# Patient Record
Sex: MALE | Race: WHITE | Employment: UNEMPLOYED | URBAN - METROPOLITAN AREA
[De-identification: names, ages, dates, MRNs, and addresses within clinical notes are randomized per-mention and may not be internally consistent; named-entity substitution may affect disease eponyms.]

---

## 2020-11-12 ENCOUNTER — HOSPITAL ENCOUNTER (INPATIENT)
Age: 40
LOS: 10 days | Discharge: HOME | DRG: 853 | End: 2020-11-22
Attending: EMERGENCY MEDICINE | Admitting: SURGERY
Payer: COMMERCIAL

## 2020-11-12 PROBLEM — M79.89 NECROTIZING SOFT TISSUE INFECTION: Status: ACTIVE | Noted: 2020-11-12

## 2020-11-12 LAB
INR BLD: 1.5
PROTHROMBIN TIME: 17.2 SEC (ref 9.3–12.4)

## 2020-11-12 PROCEDURE — 87040 BLOOD CULTURE FOR BACTERIA: CPT

## 2020-11-12 PROCEDURE — 85730 THROMBOPLASTIN TIME PARTIAL: CPT

## 2020-11-12 PROCEDURE — 83735 ASSAY OF MAGNESIUM: CPT

## 2020-11-12 PROCEDURE — 85610 PROTHROMBIN TIME: CPT

## 2020-11-12 PROCEDURE — 85025 COMPLETE CBC W/AUTO DIFF WBC: CPT

## 2020-11-12 PROCEDURE — 83605 ASSAY OF LACTIC ACID: CPT

## 2020-11-12 PROCEDURE — 2000000000 HC ICU R&B

## 2020-11-12 PROCEDURE — 80053 COMPREHEN METABOLIC PANEL: CPT

## 2020-11-12 PROCEDURE — 99283 EMERGENCY DEPT VISIT LOW MDM: CPT

## 2020-11-12 RX ORDER — CLINDAMYCIN PHOSPHATE 600 MG/50ML
600 INJECTION INTRAVENOUS EVERY 8 HOURS
Status: DISCONTINUED | OUTPATIENT
Start: 2020-11-13 | End: 2020-11-13

## 2020-11-12 RX ORDER — SODIUM CHLORIDE 9 MG/ML
1000 INJECTION, SOLUTION INTRAVENOUS CONTINUOUS
Status: DISCONTINUED | OUTPATIENT
Start: 2020-11-12 | End: 2020-11-13

## 2020-11-13 ENCOUNTER — ANESTHESIA (OUTPATIENT)
Dept: OPERATING ROOM | Age: 40
DRG: 853 | End: 2020-11-13
Payer: COMMERCIAL

## 2020-11-13 ENCOUNTER — ANESTHESIA EVENT (OUTPATIENT)
Dept: OPERATING ROOM | Age: 40
DRG: 853 | End: 2020-11-13
Payer: COMMERCIAL

## 2020-11-13 VITALS — DIASTOLIC BLOOD PRESSURE: 73 MMHG | OXYGEN SATURATION: 87 % | TEMPERATURE: 101.3 F | SYSTOLIC BLOOD PRESSURE: 132 MMHG

## 2020-11-13 LAB
ALBUMIN SERPL-MCNC: 2.3 G/DL (ref 3.5–5.2)
ALBUMIN SERPL-MCNC: 2.4 G/DL (ref 3.5–5.2)
ALP BLD-CCNC: 166 U/L (ref 40–129)
ALP BLD-CCNC: 201 U/L (ref 40–129)
ALT SERPL-CCNC: 11 U/L (ref 0–40)
ALT SERPL-CCNC: 11 U/L (ref 0–40)
ANION GAP SERPL CALCULATED.3IONS-SCNC: 11 MMOL/L (ref 7–16)
ANION GAP SERPL CALCULATED.3IONS-SCNC: 13 MMOL/L (ref 7–16)
ANISOCYTOSIS: ABNORMAL
ANISOCYTOSIS: ABNORMAL
APTT: 26.4 SEC (ref 24.5–35.1)
AST SERPL-CCNC: 17 U/L (ref 0–39)
AST SERPL-CCNC: 19 U/L (ref 0–39)
BASOPHILS ABSOLUTE: 0 E9/L (ref 0–0.2)
BASOPHILS ABSOLUTE: 0 E9/L (ref 0–0.2)
BASOPHILS RELATIVE PERCENT: 0.1 % (ref 0–2)
BASOPHILS RELATIVE PERCENT: 0.2 % (ref 0–2)
BILIRUB SERPL-MCNC: 3.4 MG/DL (ref 0–1.2)
BILIRUB SERPL-MCNC: 3.7 MG/DL (ref 0–1.2)
BUN BLDV-MCNC: 15 MG/DL (ref 6–20)
BUN BLDV-MCNC: 18 MG/DL (ref 6–20)
BURR CELLS: ABNORMAL
BURR CELLS: ABNORMAL
CALCIUM SERPL-MCNC: 8 MG/DL (ref 8.6–10.2)
CALCIUM SERPL-MCNC: 8.1 MG/DL (ref 8.6–10.2)
CHLORIDE BLD-SCNC: 93 MMOL/L (ref 98–107)
CHLORIDE BLD-SCNC: 97 MMOL/L (ref 98–107)
CO2: 22 MMOL/L (ref 22–29)
CO2: 26 MMOL/L (ref 22–29)
CREAT SERPL-MCNC: 0.7 MG/DL (ref 0.7–1.2)
CREAT SERPL-MCNC: 0.9 MG/DL (ref 0.7–1.2)
EOSINOPHILS ABSOLUTE: 0 E9/L (ref 0.05–0.5)
EOSINOPHILS ABSOLUTE: 0 E9/L (ref 0.05–0.5)
EOSINOPHILS RELATIVE PERCENT: 0 % (ref 0–6)
EOSINOPHILS RELATIVE PERCENT: 0.1 % (ref 0–6)
GFR AFRICAN AMERICAN: >60
GFR AFRICAN AMERICAN: >60
GFR NON-AFRICAN AMERICAN: >60 ML/MIN/1.73
GFR NON-AFRICAN AMERICAN: >60 ML/MIN/1.73
GLUCOSE BLD-MCNC: 118 MG/DL (ref 74–99)
GLUCOSE BLD-MCNC: 124 MG/DL (ref 74–99)
GRAM STAIN ORDERABLE: NORMAL
HCT VFR BLD CALC: 33 % (ref 37–54)
HCT VFR BLD CALC: 34.8 % (ref 37–54)
HEMOGLOBIN: 11.3 G/DL (ref 12.5–16.5)
HEMOGLOBIN: 11.7 G/DL (ref 12.5–16.5)
LACTIC ACID: 1.1 MMOL/L (ref 0.5–2.2)
LYMPHOCYTES ABSOLUTE: 0 E9/L (ref 1.5–4)
LYMPHOCYTES ABSOLUTE: 0.77 E9/L (ref 1.5–4)
LYMPHOCYTES RELATIVE PERCENT: 1.8 % (ref 20–42)
LYMPHOCYTES RELATIVE PERCENT: 1.8 % (ref 20–42)
MAGNESIUM: 2.1 MG/DL (ref 1.6–2.6)
MAGNESIUM: 2.2 MG/DL (ref 1.6–2.6)
MCH RBC QN AUTO: 28.1 PG (ref 26–35)
MCH RBC QN AUTO: 28.5 PG (ref 26–35)
MCHC RBC AUTO-ENTMCNC: 33.6 % (ref 32–34.5)
MCHC RBC AUTO-ENTMCNC: 34.2 % (ref 32–34.5)
MCV RBC AUTO: 83.3 FL (ref 80–99.9)
MCV RBC AUTO: 83.5 FL (ref 80–99.9)
METAMYELOCYTES RELATIVE PERCENT: 0.9 % (ref 0–1)
MONOCYTES ABSOLUTE: 0.85 E9/L (ref 0.1–0.95)
MONOCYTES ABSOLUTE: 1.55 E9/L (ref 0.1–0.95)
MONOCYTES RELATIVE PERCENT: 1.7 % (ref 2–12)
MONOCYTES RELATIVE PERCENT: 4.4 % (ref 2–12)
NEUTROPHILS ABSOLUTE: 36.38 E9/L (ref 1.8–7.3)
NEUTROPHILS ABSOLUTE: 41.85 E9/L (ref 1.8–7.3)
NEUTROPHILS RELATIVE PERCENT: 93.9 % (ref 43–80)
NEUTROPHILS RELATIVE PERCENT: 97.4 % (ref 43–80)
NUCLEATED RED BLOOD CELLS: 0.9 /100 WBC
OVALOCYTES: ABNORMAL
PDW BLD-RTO: 14.6 FL (ref 11.5–15)
PDW BLD-RTO: 14.7 FL (ref 11.5–15)
PHOSPHORUS: 3.7 MG/DL (ref 2.5–4.5)
PLATELET # BLD: 388 E9/L (ref 130–450)
PLATELET # BLD: 392 E9/L (ref 130–450)
PMV BLD AUTO: 10.6 FL (ref 7–12)
PMV BLD AUTO: 11.4 FL (ref 7–12)
POIKILOCYTES: ABNORMAL
POIKILOCYTES: ABNORMAL
POLYCHROMASIA: ABNORMAL
POLYCHROMASIA: ABNORMAL
POTASSIUM REFLEX MAGNESIUM: 3.4 MMOL/L (ref 3.5–5)
POTASSIUM SERPL-SCNC: 3.7 MMOL/L (ref 3.5–5)
RBC # BLD: 3.96 E12/L (ref 3.8–5.8)
RBC # BLD: 4.17 E12/L (ref 3.8–5.8)
SCHISTOCYTES: ABNORMAL
SODIUM BLD-SCNC: 130 MMOL/L (ref 132–146)
SODIUM BLD-SCNC: 132 MMOL/L (ref 132–146)
TARGET CELLS: ABNORMAL
TOTAL PROTEIN: 5.6 G/DL (ref 6.4–8.3)
TOTAL PROTEIN: 6.2 G/DL (ref 6.4–8.3)
WBC # BLD: 38.7 E9/L (ref 4.5–11.5)
WBC # BLD: 42.7 E9/L (ref 4.5–11.5)

## 2020-11-13 PROCEDURE — 94770 HC ETCO2 MONITOR DAILY: CPT

## 2020-11-13 PROCEDURE — 97530 THERAPEUTIC ACTIVITIES: CPT

## 2020-11-13 PROCEDURE — 97535 SELF CARE MNGMENT TRAINING: CPT

## 2020-11-13 PROCEDURE — 85025 COMPLETE CBC W/AUTO DIFF WBC: CPT

## 2020-11-13 PROCEDURE — 6360000002 HC RX W HCPCS: Performed by: STUDENT IN AN ORGANIZED HEALTH CARE EDUCATION/TRAINING PROGRAM

## 2020-11-13 PROCEDURE — 2580000003 HC RX 258: Performed by: EMERGENCY MEDICINE

## 2020-11-13 PROCEDURE — 2000000000 HC ICU R&B

## 2020-11-13 PROCEDURE — 87186 SC STD MICRODIL/AGAR DIL: CPT

## 2020-11-13 PROCEDURE — 83735 ASSAY OF MAGNESIUM: CPT

## 2020-11-13 PROCEDURE — 3600000012 HC SURGERY LEVEL 2 ADDTL 15MIN: Performed by: SURGERY

## 2020-11-13 PROCEDURE — 6360000002 HC RX W HCPCS: Performed by: SURGERY

## 2020-11-13 PROCEDURE — 3700000000 HC ANESTHESIA ATTENDED CARE: Performed by: SURGERY

## 2020-11-13 PROCEDURE — 80053 COMPREHEN METABOLIC PANEL: CPT

## 2020-11-13 PROCEDURE — 97161 PT EVAL LOW COMPLEX 20 MIN: CPT

## 2020-11-13 PROCEDURE — 6370000000 HC RX 637 (ALT 250 FOR IP): Performed by: SURGERY

## 2020-11-13 PROCEDURE — 11005 DBRDMT SKIN ABDOMINAL WALL: CPT | Performed by: SURGERY

## 2020-11-13 PROCEDURE — 97166 OT EVAL MOD COMPLEX 45 MIN: CPT

## 2020-11-13 PROCEDURE — 2500000003 HC RX 250 WO HCPCS: Performed by: NURSE ANESTHETIST, CERTIFIED REGISTERED

## 2020-11-13 PROCEDURE — 2580000003 HC RX 258: Performed by: SURGERY

## 2020-11-13 PROCEDURE — 6360000002 HC RX W HCPCS: Performed by: NURSE ANESTHETIST, CERTIFIED REGISTERED

## 2020-11-13 PROCEDURE — 87077 CULTURE AEROBIC IDENTIFY: CPT

## 2020-11-13 PROCEDURE — 3700000001 HC ADD 15 MINUTES (ANESTHESIA): Performed by: SURGERY

## 2020-11-13 PROCEDURE — 46040 I&D ISCHIORCT&/PERIRCT ABSC: CPT | Performed by: SURGERY

## 2020-11-13 PROCEDURE — 87070 CULTURE OTHR SPECIMN AEROBIC: CPT

## 2020-11-13 PROCEDURE — 2500000003 HC RX 250 WO HCPCS: Performed by: SURGERY

## 2020-11-13 PROCEDURE — 3600000002 HC SURGERY LEVEL 2 BASE: Performed by: SURGERY

## 2020-11-13 PROCEDURE — 2500000003 HC RX 250 WO HCPCS: Performed by: STUDENT IN AN ORGANIZED HEALTH CARE EDUCATION/TRAINING PROGRAM

## 2020-11-13 PROCEDURE — 87075 CULTR BACTERIA EXCEPT BLOOD: CPT

## 2020-11-13 PROCEDURE — 2580000003 HC RX 258: Performed by: STUDENT IN AN ORGANIZED HEALTH CARE EDUCATION/TRAINING PROGRAM

## 2020-11-13 PROCEDURE — 99223 1ST HOSP IP/OBS HIGH 75: CPT | Performed by: SURGERY

## 2020-11-13 PROCEDURE — 7100000001 HC PACU RECOVERY - ADDTL 15 MIN

## 2020-11-13 PROCEDURE — 7100000000 HC PACU RECOVERY - FIRST 15 MIN

## 2020-11-13 PROCEDURE — 6370000000 HC RX 637 (ALT 250 FOR IP): Performed by: STUDENT IN AN ORGANIZED HEALTH CARE EDUCATION/TRAINING PROGRAM

## 2020-11-13 PROCEDURE — 87205 SMEAR GRAM STAIN: CPT

## 2020-11-13 PROCEDURE — 0JBB0ZZ EXCISION OF PERINEUM SUBCUTANEOUS TISSUE AND FASCIA, OPEN APPROACH: ICD-10-PCS | Performed by: SURGERY

## 2020-11-13 PROCEDURE — 36415 COLL VENOUS BLD VENIPUNCTURE: CPT

## 2020-11-13 PROCEDURE — 87081 CULTURE SCREEN ONLY: CPT

## 2020-11-13 PROCEDURE — 2580000003 HC RX 258: Performed by: NURSE ANESTHETIST, CERTIFIED REGISTERED

## 2020-11-13 PROCEDURE — 84100 ASSAY OF PHOSPHORUS: CPT

## 2020-11-13 PROCEDURE — 2709999900 HC NON-CHARGEABLE SUPPLY: Performed by: SURGERY

## 2020-11-13 RX ORDER — BUPRENORPHINE HYDROCHLORIDE AND NALOXONE HYDROCHLORIDE DIHYDRATE 8; 2 MG/1; MG/1
1 TABLET SUBLINGUAL 2 TIMES DAILY
COMMUNITY

## 2020-11-13 RX ORDER — POTASSIUM CHLORIDE 20 MEQ/1
40 TABLET, EXTENDED RELEASE ORAL ONCE
Status: COMPLETED | OUTPATIENT
Start: 2020-11-13 | End: 2020-11-13

## 2020-11-13 RX ORDER — BUPRENORPHINE HYDROCHLORIDE AND NALOXONE HYDROCHLORIDE DIHYDRATE 8; 2 MG/1; MG/1
1 TABLET SUBLINGUAL 2 TIMES DAILY
Status: DISCONTINUED | OUTPATIENT
Start: 2020-11-13 | End: 2020-11-22 | Stop reason: HOSPADM

## 2020-11-13 RX ORDER — SODIUM CHLORIDE, SODIUM LACTATE, POTASSIUM CHLORIDE, AND CALCIUM CHLORIDE .6; .31; .03; .02 G/100ML; G/100ML; G/100ML; G/100ML
1000 INJECTION, SOLUTION INTRAVENOUS ONCE
Status: COMPLETED | OUTPATIENT
Start: 2020-11-13 | End: 2020-11-13

## 2020-11-13 RX ORDER — SODIUM CHLORIDE 0.9 % (FLUSH) 0.9 %
10 SYRINGE (ML) INJECTION PRN
Status: DISCONTINUED | OUTPATIENT
Start: 2020-11-13 | End: 2020-11-22 | Stop reason: HOSPADM

## 2020-11-13 RX ORDER — FENTANYL CITRATE 50 UG/ML
INJECTION, SOLUTION INTRAMUSCULAR; INTRAVENOUS PRN
Status: DISCONTINUED | OUTPATIENT
Start: 2020-11-13 | End: 2020-11-13 | Stop reason: SDUPTHER

## 2020-11-13 RX ORDER — SUCCINYLCHOLINE/SOD CL,ISO/PF 200MG/10ML
SYRINGE (ML) INTRAVENOUS PRN
Status: DISCONTINUED | OUTPATIENT
Start: 2020-11-13 | End: 2020-11-13 | Stop reason: SDUPTHER

## 2020-11-13 RX ORDER — SODIUM CHLORIDE, SODIUM LACTATE, POTASSIUM CHLORIDE, CALCIUM CHLORIDE 600; 310; 30; 20 MG/100ML; MG/100ML; MG/100ML; MG/100ML
INJECTION, SOLUTION INTRAVENOUS CONTINUOUS PRN
Status: DISCONTINUED | OUTPATIENT
Start: 2020-11-13 | End: 2020-11-13 | Stop reason: SDUPTHER

## 2020-11-13 RX ORDER — ACETAMINOPHEN 325 MG/1
650 TABLET ORAL EVERY 6 HOURS
Status: DISCONTINUED | OUTPATIENT
Start: 2020-11-13 | End: 2020-11-22 | Stop reason: HOSPADM

## 2020-11-13 RX ORDER — FENTANYL CITRATE 50 UG/ML
50 INJECTION, SOLUTION INTRAMUSCULAR; INTRAVENOUS ONCE
Status: COMPLETED | OUTPATIENT
Start: 2020-11-13 | End: 2020-11-13

## 2020-11-13 RX ORDER — ONDANSETRON 2 MG/ML
INJECTION INTRAMUSCULAR; INTRAVENOUS PRN
Status: DISCONTINUED | OUTPATIENT
Start: 2020-11-13 | End: 2020-11-13 | Stop reason: SDUPTHER

## 2020-11-13 RX ORDER — ONDANSETRON 2 MG/ML
4 INJECTION INTRAMUSCULAR; INTRAVENOUS EVERY 6 HOURS PRN
Status: DISCONTINUED | OUTPATIENT
Start: 2020-11-13 | End: 2020-11-13 | Stop reason: SDUPTHER

## 2020-11-13 RX ORDER — MORPHINE SULFATE 2 MG/ML
2 INJECTION, SOLUTION INTRAMUSCULAR; INTRAVENOUS EVERY 5 MIN PRN
Status: DISCONTINUED | OUTPATIENT
Start: 2020-11-13 | End: 2020-11-13 | Stop reason: HOSPADM

## 2020-11-13 RX ORDER — HYDROCODONE BITARTRATE AND ACETAMINOPHEN 5; 325 MG/1; MG/1
1 TABLET ORAL PRN
Status: DISCONTINUED | OUTPATIENT
Start: 2020-11-13 | End: 2020-11-13 | Stop reason: HOSPADM

## 2020-11-13 RX ORDER — NEOSTIGMINE METHYLSULFATE 1 MG/ML
INJECTION, SOLUTION INTRAVENOUS PRN
Status: DISCONTINUED | OUTPATIENT
Start: 2020-11-13 | End: 2020-11-13 | Stop reason: SDUPTHER

## 2020-11-13 RX ORDER — PROMETHAZINE HYDROCHLORIDE 25 MG/ML
6.25 INJECTION, SOLUTION INTRAMUSCULAR; INTRAVENOUS EVERY 10 MIN PRN
Status: DISCONTINUED | OUTPATIENT
Start: 2020-11-13 | End: 2020-11-13 | Stop reason: HOSPADM

## 2020-11-13 RX ORDER — CLINDAMYCIN PHOSPHATE 900 MG/50ML
900 INJECTION INTRAVENOUS EVERY 8 HOURS
Status: DISCONTINUED | OUTPATIENT
Start: 2020-11-13 | End: 2020-11-15

## 2020-11-13 RX ORDER — OXYCODONE HYDROCHLORIDE 10 MG/1
10 TABLET ORAL EVERY 4 HOURS PRN
Status: DISCONTINUED | OUTPATIENT
Start: 2020-11-13 | End: 2020-11-22 | Stop reason: HOSPADM

## 2020-11-13 RX ORDER — SODIUM HYPOCHLORITE 2.5 MG/ML
SOLUTION TOPICAL DAILY
Status: DISCONTINUED | OUTPATIENT
Start: 2020-11-13 | End: 2020-11-22 | Stop reason: HOSPADM

## 2020-11-13 RX ORDER — GLYCOPYRROLATE 1 MG/5 ML
SYRINGE (ML) INTRAVENOUS PRN
Status: DISCONTINUED | OUTPATIENT
Start: 2020-11-13 | End: 2020-11-13 | Stop reason: SDUPTHER

## 2020-11-13 RX ORDER — MIDAZOLAM HYDROCHLORIDE 1 MG/ML
INJECTION INTRAMUSCULAR; INTRAVENOUS PRN
Status: DISCONTINUED | OUTPATIENT
Start: 2020-11-13 | End: 2020-11-13 | Stop reason: SDUPTHER

## 2020-11-13 RX ORDER — HYDROCODONE BITARTRATE AND ACETAMINOPHEN 5; 325 MG/1; MG/1
2 TABLET ORAL PRN
Status: DISCONTINUED | OUTPATIENT
Start: 2020-11-13 | End: 2020-11-13 | Stop reason: HOSPADM

## 2020-11-13 RX ORDER — MORPHINE SULFATE/0.9% NACL/PF 1 MG/ML
SYRINGE (ML) INJECTION CONTINUOUS
Status: DISCONTINUED | OUTPATIENT
Start: 2020-11-13 | End: 2020-11-13

## 2020-11-13 RX ORDER — PROPOFOL 10 MG/ML
INJECTION, EMULSION INTRAVENOUS PRN
Status: DISCONTINUED | OUTPATIENT
Start: 2020-11-13 | End: 2020-11-13 | Stop reason: SDUPTHER

## 2020-11-13 RX ORDER — DEXAMETHASONE SODIUM PHOSPHATE 10 MG/ML
INJECTION, SOLUTION INTRAMUSCULAR; INTRAVENOUS PRN
Status: DISCONTINUED | OUTPATIENT
Start: 2020-11-13 | End: 2020-11-13 | Stop reason: SDUPTHER

## 2020-11-13 RX ORDER — MEPERIDINE HYDROCHLORIDE 25 MG/ML
12.5 INJECTION INTRAMUSCULAR; INTRAVENOUS; SUBCUTANEOUS EVERY 5 MIN PRN
Status: DISCONTINUED | OUTPATIENT
Start: 2020-11-13 | End: 2020-11-13 | Stop reason: HOSPADM

## 2020-11-13 RX ORDER — LIDOCAINE HYDROCHLORIDE 20 MG/ML
INJECTION, SOLUTION INTRAVENOUS PRN
Status: DISCONTINUED | OUTPATIENT
Start: 2020-11-13 | End: 2020-11-13 | Stop reason: SDUPTHER

## 2020-11-13 RX ORDER — MORPHINE SULFATE 2 MG/ML
1 INJECTION, SOLUTION INTRAMUSCULAR; INTRAVENOUS EVERY 5 MIN PRN
Status: DISCONTINUED | OUTPATIENT
Start: 2020-11-13 | End: 2020-11-13 | Stop reason: HOSPADM

## 2020-11-13 RX ORDER — PROMETHAZINE HYDROCHLORIDE 25 MG/1
12.5 TABLET ORAL EVERY 6 HOURS PRN
Status: DISCONTINUED | OUTPATIENT
Start: 2020-11-13 | End: 2020-11-13

## 2020-11-13 RX ORDER — ROCURONIUM BROMIDE 10 MG/ML
INJECTION, SOLUTION INTRAVENOUS PRN
Status: DISCONTINUED | OUTPATIENT
Start: 2020-11-13 | End: 2020-11-13 | Stop reason: SDUPTHER

## 2020-11-13 RX ORDER — SODIUM CHLORIDE 0.9 % (FLUSH) 0.9 %
10 SYRINGE (ML) INJECTION EVERY 12 HOURS SCHEDULED
Status: DISCONTINUED | OUTPATIENT
Start: 2020-11-13 | End: 2020-11-22 | Stop reason: HOSPADM

## 2020-11-13 RX ORDER — NALOXONE HYDROCHLORIDE 0.4 MG/ML
0.4 INJECTION, SOLUTION INTRAMUSCULAR; INTRAVENOUS; SUBCUTANEOUS PRN
Status: DISCONTINUED | OUTPATIENT
Start: 2020-11-13 | End: 2020-11-13

## 2020-11-13 RX ORDER — OXYCODONE HYDROCHLORIDE 5 MG/1
5 TABLET ORAL EVERY 4 HOURS PRN
Status: DISCONTINUED | OUTPATIENT
Start: 2020-11-13 | End: 2020-11-22 | Stop reason: HOSPADM

## 2020-11-13 RX ORDER — ONDANSETRON 2 MG/ML
4 INJECTION INTRAMUSCULAR; INTRAVENOUS EVERY 6 HOURS PRN
Status: DISCONTINUED | OUTPATIENT
Start: 2020-11-13 | End: 2020-11-22 | Stop reason: HOSPADM

## 2020-11-13 RX ORDER — FENTANYL CITRATE 50 UG/ML
100 INJECTION, SOLUTION INTRAMUSCULAR; INTRAVENOUS
Status: DISCONTINUED | OUTPATIENT
Start: 2020-11-13 | End: 2020-11-15

## 2020-11-13 RX ORDER — SODIUM CHLORIDE, SODIUM LACTATE, POTASSIUM CHLORIDE, CALCIUM CHLORIDE 600; 310; 30; 20 MG/100ML; MG/100ML; MG/100ML; MG/100ML
INJECTION, SOLUTION INTRAVENOUS CONTINUOUS
Status: DISCONTINUED | OUTPATIENT
Start: 2020-11-13 | End: 2020-11-14

## 2020-11-13 RX ADMIN — PIPERACILLIN AND TAZOBACTAM 3.38 G: 3; .375 INJECTION, POWDER, LYOPHILIZED, FOR SOLUTION INTRAVENOUS at 21:10

## 2020-11-13 RX ADMIN — ENOXAPARIN SODIUM 40 MG: 40 INJECTION SUBCUTANEOUS at 08:39

## 2020-11-13 RX ADMIN — Medication 0.6 MG: at 01:48

## 2020-11-13 RX ADMIN — SODIUM CHLORIDE: 9 INJECTION, SOLUTION INTRAVENOUS at 08:17

## 2020-11-13 RX ADMIN — BUPRENORPHINE HYDROCHLORIDE AND NALOXONE HYDROCHLORIDE DIHYDRATE 1 TABLET: 8; 2 TABLET SUBLINGUAL at 12:32

## 2020-11-13 RX ADMIN — MORPHINE SULFATE 30 MG: 1 INJECTION INTRAVENOUS at 02:29

## 2020-11-13 RX ADMIN — Medication 3 MG: at 01:48

## 2020-11-13 RX ADMIN — VANCOMYCIN HYDROCHLORIDE 1.5 G: 10 INJECTION, POWDER, LYOPHILIZED, FOR SOLUTION INTRAVENOUS at 04:56

## 2020-11-13 RX ADMIN — BUPRENORPHINE HYDROCHLORIDE AND NALOXONE HYDROCHLORIDE DIHYDRATE 1 TABLET: 8; 2 TABLET SUBLINGUAL at 21:09

## 2020-11-13 RX ADMIN — SODIUM CHLORIDE: 9 INJECTION, SOLUTION INTRAVENOUS at 16:49

## 2020-11-13 RX ADMIN — VANCOMYCIN HYDROCHLORIDE 1.5 G: 10 INJECTION, POWDER, LYOPHILIZED, FOR SOLUTION INTRAVENOUS at 16:48

## 2020-11-13 RX ADMIN — ROCURONIUM BROMIDE 40 MG: 10 INJECTION, SOLUTION INTRAVENOUS at 01:02

## 2020-11-13 RX ADMIN — PROPOFOL 200 MG: 10 INJECTION, EMULSION INTRAVENOUS at 00:52

## 2020-11-13 RX ADMIN — SODIUM CHLORIDE 1000 ML: 9 INJECTION, SOLUTION INTRAVENOUS at 00:24

## 2020-11-13 RX ADMIN — SODIUM CHLORIDE, POTASSIUM CHLORIDE, SODIUM LACTATE AND CALCIUM CHLORIDE 1000 ML: 600; 310; 30; 20 INJECTION, SOLUTION INTRAVENOUS at 12:50

## 2020-11-13 RX ADMIN — CLINDAMYCIN PHOSPHATE 600 MG: 600 INJECTION, SOLUTION INTRAVENOUS at 00:24

## 2020-11-13 RX ADMIN — SODIUM CHLORIDE, POTASSIUM CHLORIDE, SODIUM LACTATE AND CALCIUM CHLORIDE: 600; 310; 30; 20 INJECTION, SOLUTION INTRAVENOUS at 13:41

## 2020-11-13 RX ADMIN — ACETAMINOPHEN 650 MG: 325 TABLET ORAL at 14:57

## 2020-11-13 RX ADMIN — ONDANSETRON HYDROCHLORIDE 4 MG: 2 INJECTION, SOLUTION INTRAMUSCULAR; INTRAVENOUS at 01:46

## 2020-11-13 RX ADMIN — SODIUM CHLORIDE, POTASSIUM CHLORIDE, SODIUM LACTATE AND CALCIUM CHLORIDE: 600; 310; 30; 20 INJECTION, SOLUTION INTRAVENOUS at 02:14

## 2020-11-13 RX ADMIN — LIDOCAINE HYDROCHLORIDE 100 MG: 20 INJECTION, SOLUTION INTRAVENOUS at 00:52

## 2020-11-13 RX ADMIN — SODIUM CHLORIDE, POTASSIUM CHLORIDE, SODIUM LACTATE AND CALCIUM CHLORIDE: 600; 310; 30; 20 INJECTION, SOLUTION INTRAVENOUS at 01:17

## 2020-11-13 RX ADMIN — SODIUM CHLORIDE, POTASSIUM CHLORIDE, SODIUM LACTATE AND CALCIUM CHLORIDE 1000 ML: 600; 310; 30; 20 INJECTION, SOLUTION INTRAVENOUS at 06:44

## 2020-11-13 RX ADMIN — MORPHINE SULFATE 30 MG: 1 INJECTION INTRAVENOUS at 05:16

## 2020-11-13 RX ADMIN — DEXAMETHASONE SODIUM PHOSPHATE 10 MG: 10 INJECTION, SOLUTION INTRAMUSCULAR; INTRAVENOUS at 01:02

## 2020-11-13 RX ADMIN — PIPERACILLIN AND TAZOBACTAM 3.38 G: 3; .375 INJECTION, POWDER, LYOPHILIZED, FOR SOLUTION INTRAVENOUS at 03:36

## 2020-11-13 RX ADMIN — POTASSIUM CHLORIDE 40 MEQ: 1500 TABLET, EXTENDED RELEASE ORAL at 12:32

## 2020-11-13 RX ADMIN — FENTANYL CITRATE 50 MCG: 50 INJECTION, SOLUTION INTRAMUSCULAR; INTRAVENOUS at 01:54

## 2020-11-13 RX ADMIN — MIDAZOLAM 2 MG: 1 INJECTION INTRAMUSCULAR; INTRAVENOUS at 00:45

## 2020-11-13 RX ADMIN — ACETAMINOPHEN 650 MG: 325 TABLET ORAL at 21:09

## 2020-11-13 RX ADMIN — MORPHINE SULFATE 30 MG: 1 INJECTION INTRAVENOUS at 03:44

## 2020-11-13 RX ADMIN — HYOSCYAMINE SULFATE: 16 SOLUTION at 21:10

## 2020-11-13 RX ADMIN — FENTANYL CITRATE 50 MCG: 50 INJECTION, SOLUTION INTRAMUSCULAR; INTRAVENOUS at 04:03

## 2020-11-13 RX ADMIN — FENTANYL CITRATE 100 MCG: 50 INJECTION, SOLUTION INTRAMUSCULAR; INTRAVENOUS at 17:03

## 2020-11-13 RX ADMIN — Medication 10 ML: at 21:10

## 2020-11-13 RX ADMIN — ACETAMINOPHEN 650 MG: 325 TABLET ORAL at 08:39

## 2020-11-13 RX ADMIN — CLINDAMYCIN PHOSPHATE 600 MG: 600 INJECTION, SOLUTION INTRAVENOUS at 08:18

## 2020-11-13 RX ADMIN — ROCURONIUM BROMIDE 10 MG: 10 INJECTION, SOLUTION INTRAVENOUS at 01:15

## 2020-11-13 RX ADMIN — OXYCODONE HYDROCHLORIDE 10 MG: 10 TABLET ORAL at 02:35

## 2020-11-13 RX ADMIN — CLINDAMYCIN PHOSPHATE 900 MG: 900 INJECTION, SOLUTION INTRAVENOUS at 16:48

## 2020-11-13 RX ADMIN — PIPERACILLIN AND TAZOBACTAM 3.38 G: 3; .375 INJECTION, POWDER, LYOPHILIZED, FOR SOLUTION INTRAVENOUS at 12:33

## 2020-11-13 RX ADMIN — FENTANYL CITRATE 150 MCG: 50 INJECTION, SOLUTION INTRAMUSCULAR; INTRAVENOUS at 00:52

## 2020-11-13 RX ADMIN — ACETAMINOPHEN 650 MG: 325 TABLET ORAL at 02:35

## 2020-11-13 RX ADMIN — Medication 140 MG: at 00:52

## 2020-11-13 RX ADMIN — FENTANYL CITRATE 50 MCG: 50 INJECTION, SOLUTION INTRAMUSCULAR; INTRAVENOUS at 01:47

## 2020-11-13 RX ADMIN — HYDROMORPHONE HYDROCHLORIDE 0.5 MG: 1 INJECTION, SOLUTION INTRAMUSCULAR; INTRAVENOUS; SUBCUTANEOUS at 02:14

## 2020-11-13 SDOH — HEALTH STABILITY: MENTAL HEALTH: HOW OFTEN DO YOU HAVE A DRINK CONTAINING ALCOHOL?: NEVER

## 2020-11-13 ASSESSMENT — PULMONARY FUNCTION TESTS
PIF_VALUE: 1
PIF_VALUE: 17
PIF_VALUE: 20
PIF_VALUE: 2
PIF_VALUE: 19
PIF_VALUE: 1
PIF_VALUE: 20
PIF_VALUE: 16
PIF_VALUE: 23
PIF_VALUE: 4
PIF_VALUE: 18
PIF_VALUE: 16
PIF_VALUE: 14
PIF_VALUE: 20
PIF_VALUE: 14
PIF_VALUE: 19
PIF_VALUE: 20
PIF_VALUE: 2
PIF_VALUE: 23
PIF_VALUE: 19
PIF_VALUE: 3
PIF_VALUE: 20
PIF_VALUE: 16
PIF_VALUE: 21
PIF_VALUE: 20
PIF_VALUE: 22
PIF_VALUE: 0
PIF_VALUE: 20
PIF_VALUE: 20
PIF_VALUE: 21
PIF_VALUE: 22
PIF_VALUE: 20
PIF_VALUE: 1
PIF_VALUE: 0
PIF_VALUE: 21
PIF_VALUE: 23
PIF_VALUE: 21
PIF_VALUE: 4
PIF_VALUE: 21
PIF_VALUE: 20
PIF_VALUE: 20
PIF_VALUE: 16
PIF_VALUE: 20
PIF_VALUE: 1
PIF_VALUE: 15
PIF_VALUE: 16
PIF_VALUE: 33
PIF_VALUE: 20
PIF_VALUE: 21
PIF_VALUE: 22
PIF_VALUE: 1
PIF_VALUE: 22
PIF_VALUE: 1
PIF_VALUE: 17
PIF_VALUE: 20
PIF_VALUE: 20
PIF_VALUE: 4
PIF_VALUE: 2
PIF_VALUE: 18
PIF_VALUE: 4
PIF_VALUE: 14
PIF_VALUE: 21
PIF_VALUE: 2
PIF_VALUE: 20
PIF_VALUE: 21
PIF_VALUE: 14
PIF_VALUE: 19
PIF_VALUE: 22

## 2020-11-13 ASSESSMENT — PAIN SCALES - GENERAL
PAINLEVEL_OUTOF10: 3
PAINLEVEL_OUTOF10: 10
PAINLEVEL_OUTOF10: 10
PAINLEVEL_OUTOF10: 4
PAINLEVEL_OUTOF10: 3
PAINLEVEL_OUTOF10: 8
PAINLEVEL_OUTOF10: 10
PAINLEVEL_OUTOF10: 10
PAINLEVEL_OUTOF10: 0
PAINLEVEL_OUTOF10: 10
PAINLEVEL_OUTOF10: 7
PAINLEVEL_OUTOF10: 10
PAINLEVEL_OUTOF10: 2
PAINLEVEL_OUTOF10: 10
PAINLEVEL_OUTOF10: 2
PAINLEVEL_OUTOF10: 2

## 2020-11-13 ASSESSMENT — PAIN DESCRIPTION - ONSET
ONSET: ON-GOING

## 2020-11-13 ASSESSMENT — PAIN DESCRIPTION - DESCRIPTORS
DESCRIPTORS: ACHING;SHARP;SORE
DESCRIPTORS: ACHING;BURNING;SORE;SHARP
DESCRIPTORS: SORE

## 2020-11-13 ASSESSMENT — PAIN DESCRIPTION - PROGRESSION
CLINICAL_PROGRESSION: NOT CHANGED

## 2020-11-13 ASSESSMENT — PAIN DESCRIPTION - FREQUENCY
FREQUENCY: CONTINUOUS

## 2020-11-13 ASSESSMENT — PAIN DESCRIPTION - LOCATION
LOCATION: PERINEUM

## 2020-11-13 ASSESSMENT — PAIN DESCRIPTION - PAIN TYPE
TYPE: ACUTE PAIN;SURGICAL PAIN

## 2020-11-13 ASSESSMENT — PAIN - FUNCTIONAL ASSESSMENT
PAIN_FUNCTIONAL_ASSESSMENT: ACTIVITIES ARE NOT PREVENTED
PAIN_FUNCTIONAL_ASSESSMENT: PREVENTS OR INTERFERES SOME ACTIVE ACTIVITIES AND ADLS
PAIN_FUNCTIONAL_ASSESSMENT: PREVENTS OR INTERFERES SOME ACTIVE ACTIVITIES AND ADLS

## 2020-11-13 ASSESSMENT — LIFESTYLE VARIABLES: SMOKING_STATUS: 1

## 2020-11-13 NOTE — PLAN OF CARE
Problem: Anxiety/Stress:  Goal: Level of anxiety will decrease  Description: Level of anxiety will decrease  Outcome: Met This Shift     Problem: Aspiration:  Goal: Absence of aspiration  Description: Absence of aspiration  Outcome: Met This Shift     Problem: Pain:  Goal: Pain level will decrease  Description: Pain level will decrease  Outcome: Met This Shift     Problem: Pain:  Goal: Recognizes and communicates pain  Description: Recognizes and communicates pain  Outcome: Met This Shift

## 2020-11-13 NOTE — CARE COORDINATION
Social Work:  Laura Woo consulted for [facility]. Met with patient in room. Discussed social work roles and discharge planning/transition of care. Pt reports currently no PCP, but planning to see family physician after discharge and declined for PCP set up at this time. Pt did not recall physician name at this time and is not currently scheduled. Pharmacy is [email or web address] in [place]. Pt currently enrolled in meds to beds, reviewed benefits and answered all questions. Pt reports still agreeable. Lives with spouse two story home with 4 JAXON. Bed/bath on 2nd floor, 17 steps. No DME PTA. No Hx HHC. No Hx SNF. Pt reports Hx Alcohol and Substance Use. Substance use reported cocaine and opiate (pill). Pt reports began struggling with substance use in his 30's. Pt reports currently on suboxone 2-3 years; pt goes to [facility]. facility for this. Alcohol use began daily around 14 and has been abstaining for 2 years. Pt denies current use of NA/AA, counseling, or sponsor. Pt denies any Hx of formal mental health diagnosis. Pt denies any symptoms of anxiety, depression, or hopelessness. Pt denies any current needs regarding substance/alcohol use and mental health. Noted I and D today. Noted IVATB vancomycin, zosyn, and clinda. Reviewed [facility] vs [facility] if continued care is needed and provided pt with list.  All questions answered. If HHC is needed, pt will need physician to follow until seen by new PCP. Pt reports spouse to offer transportation at d/c.  SW/CM will continue to follow for pt needs and assist with planning.      Electronically signed by SEGUNDO Seals on 11/13/2020 at 10:02 AM

## 2020-11-13 NOTE — H&P
GENERAL SURGERY  CONSULT NOTE  11/12/2020    HPI  Parul Vaz is a 37 y.o. male who presents for evaluation of scrotal edema, pain and drainage. Patient states that he started having perianal drainage a week ago and that it stopped spontaneously, then over the last several days the drainage worsened and he developed significant scrotal edema, erythema, and pain. Patient is not a known diabetic, he states that he has a history of Ulcerative Colitis but has not seen anyone in regards to this for several years. He states \"I think it may be a fistula\". Chart reviewed from [facility]: WBC 50, Na 123, Cr 1.56, Glc 117. No past medical history on file. No past surgical history on file. Medications Prior to Admission:    Prior to Admission medications    Not on File       No Known Allergies    No family history on file. Social History     Tobacco Use    Smoking status: Not on file   Substance Use Topics    Alcohol use: Not on file    Drug use: Not on file         Review of Systems   General ROS: positive for  - fatigue, fever and malaise  Hematological and Lymphatic ROS: negative  Respiratory ROS: no cough, shortness of breath, or wheezing  Cardiovascular ROS: no chest pain or dyspnea on exertion  Gastrointestinal ROS: no abdominal pain, change in bowel habits, or black or bloody stools  Genito-Urinary ROS: positive for - pelvic pain and scrotal mass/pain  Musculoskeletal ROS: negative      PHYSICAL EXAM:    Vitals:    11/12/20 2214   BP: 112/60   Pulse: 104   Resp: 14   Temp: 98.6 °F (37 °C)   SpO2: 98%       General Appearance:  awake, alert, oriented, in no acute distress  Skin:  Skin color, texture, turgor normal. No rashes or lesions. Head/face:  NCAT  Eyes:  No gross abnormalities. Lungs:  Normal expansion. Clear to auscultation. No rales, rhonchi, or wheezing. Heart:  Heart regular rate and rhythm  Abdomen:  Soft, NT, ND  Extremities: Extremities warm to touch, pink, with no edema.   Male : Right >L groin edema and cellulitis, scrotal edema and cellulitis, cellulitis extending retirement down the right thigh, perianal buttock edema and drainage      ASSESSMENT:  37 y.o. male with NSTI of the right groin, scrotum, perineum    PLAN:  Admit to SICU  NPO  IVF  IV abx- Vanc/zosyn/clinda  PRN pain medications  To OR for debridement  D/w Dr. Denise Spain    Electronically signed by Melanie Rao DO on 11/12/20 at 11:30 PM EST

## 2020-11-13 NOTE — PROGRESS NOTES
Physical Therapy  Physical Therapy Initial Assessment     Name: Zana Schneider  : 1980  MRN: [id number]    Referring Provider:  Malcolm Hernandez DO     Date of Service: 2020    Evaluating PT:  Rene Duggan, PT, DPT [id number]    Room #:  6079/9649-V  Diagnosis:  Necrotizing soft tissue infection  Precautions: Falls  Procedure/Surgery:   excisional drainage of R inguinal, scrotum, perineum, I&D rectum with penrose placement  PMHx/PSHx:  Ulcerative colitis  Equipment Needs:  none    SUBJECTIVE:    Pt lives with spouse in a 2 story home with 4 stairs to enter and no rail. Bedroom/bathroom on 2nd floor with 17 stairs and 1 rail. Pt ambulated with no AD PTA. OBJECTIVE:   Initial Evaluation  Date:  Treatment Short Term/ Long Term   Goals   AM-PAC 6 Clicks      Was pt agreeable to Eval/treatment? yes     Does pt have pain?  Denies      Bed Mobility  Rolling: NT  Supine to sit: SBA with HOB elevated   Sit to supine: NT  Scooting: SBA  Independent    Transfers Sit to stand: CGA  Stand to sit: CGA  Stand pivot: CGA no AD  Independent    Ambulation   200 feet with no AD CGA  >400 feet with no AD Independent    Stair negotiation: ascended and descended NT  >17 steps with 1 rail Mod I   ROM BUE:  Defer to OT note  BLE:  WFL     Strength BUE:  Defer to OT note  BLE:  5/5     Balance Sitting EOB:  SBA  Dynamic Standing:  CGA no AD  Sitting EOB:  Independent   Dynamic Standing:  Independent      Pt is A & O x 3  CAM-ICU: NT  RASS: 0  Sensation:  WFL  Edema:  Unremarkable     Vitals:  Heart Rate at rest 60 bpm Heart Rate post session 59 bpm   SpO2 at rest 96% SpO2 post session 95%   Blood Pressure at rest 101/57 mmHg Blood Pressure post session 110/52 mmHg     Functional Status Score-Intensive Care Unit (FSS-ICU)   Rolling -/7   Supine to sit transfer 4/7   Unsupported sitting  4/7   Sit to stand transfers 4/7   Ambulation 4/7   Total  16/35       Therapeutic Exercises:  Functional activity    Patient education  Pt educated on role of PT, sequencing of transfers    Patient response to education:   Pt verbalized understanding Pt demonstrated skill Pt requires further education in this area   yes yes reinforce     ASSESSMENT:    Comments:  RN reported pt was stable for session. Pt was reclined supine in bed upon arrival, agreeable to initial evaluation. Pt was eager for mobility and required cues to remain in bed until lines were rearranged. Pt initially completed shuffled steps to chair. Ambulation in lopez was slow with increased lateral sway with mild LOB x2 requiring Victorino to correct. Wide TABATHA also noted. Pt was left sitting in chair with all needs met and call light in reach. All lines remained intact. RN notified of assessment. Treatment:  Patient practiced and was instructed in the following treatment:     Bed mobility training - pt given verbal and tactile cues to facilitate proper sequencing and safety during supine>sit to complete task.  Sitting EOB for >8 minutes for upright tolerance, postural awareness and BLE ROM   Transfer training - pt was given verbal and tactile cues to facilitate proper hand placement, technique and safety during sit to stand and stand to sit to complete task.  Gait training- pt was given verbal and tactile cues to facilitate normal gait pattern and hilton and balance during ambulation to complete task. Pt's/ family goals   1. None stated    Patient and or family understand(s) diagnosis, prognosis, and plan of care. yes    PLAN OF CARE:    Current Treatment Recommendations     [] Strengthening     [] ROM   [x] Balance Training   [x] Endurance Training   [x] Transfer Training   [x] Gait Training   [x] Stair Training   [] Positioning   [x] Safety and Education Training   [] Patient/Caregiver Education   [] HEP  [] Other     Frequency of treatments: 2-5x/week x 1-2 weeks.     Time in  1340  Time out  1410    Total Treatment Time  25 minutes     Evaluation Time includes thorough review of current medical information, gathering information on past medical history/social history and prior level of function, completion of standardized testing/informal observation of tasks, assessment of data and education on plan of care and goals.     CPT codes:  [x] Low Complexity PT evaluation 39385  [] Moderate Complexity PT evaluation 42661  [] High Complexity PT evaluation 54893  [] PT Re-evaluation 74087  [] Gait training 40628 - minutes  [] Manual therapy 76842 - minutes  [x] Therapeutic activities 68202 25 minutes  [] Therapeutic exercises 76836 - minutes  [] Neuromuscular reeducation 20873 - minutes     Antonio Dahl, SPT  Qasim Vigil, PT, DPT  [id number]

## 2020-11-13 NOTE — PLAN OF CARE
Problem: Anxiety/Stress:  Goal: Level of anxiety will decrease  Description: Level of anxiety will decrease  11/13/2020 1514 by Tommy Richardson RN  Outcome: Met This Shift  11/13/2020 0220 by Mercy Randhawa RN  Outcome: Met This Shift     Problem: Aspiration:  Goal: Absence of aspiration  Description: Absence of aspiration  11/13/2020 1514 by Tommy Richardson RN  Outcome: Met This Shift  11/13/2020 0220 by Mercy Randhawa RN  Outcome: Met This Shift     Problem: Cardiac Output - Decreased:  Goal: Hemodynamic stability will improve  Description: Hemodynamic stability will improve  Outcome: Met This Shift     Problem: Pain:  Goal: Pain level will decrease  Description: Pain level will decrease  11/13/2020 0220 by Mercy Randhawa RN  Outcome: Met This Shift  Goal: Recognizes and communicates pain  Description: Recognizes and communicates pain  11/13/2020 0220 by Mercy Randhawa RN  Outcome: Met This Shift     Problem: Sleep Pattern Disturbance:  Goal: Appears well-rested  Description: Appears well-rested  Outcome: Met This Shift     Problem: Tissue Perfusion, Altered:  Goal: Circulatory function within specified parameters  Description: Circulatory function within specified parameters  Outcome: Met This Shift     Problem: Falls - Risk of:  Goal: Will remain free from falls  Description: Will remain free from falls  Outcome: Met This Shift  Goal: Absence of physical injury  Description: Absence of physical injury  Outcome: Met This Shift     Problem: Pain:  Goal: Pain level will decrease  Description: Pain level will decrease  11/13/2020 0220 by Mercy Randhawa RN  Outcome: Met This Shift

## 2020-11-13 NOTE — PROGRESS NOTES
Pharmacy Consultation Note  (Antibiotic Dosing and Monitoring)    Initial consult date: 2020  Consulting physician: Dr. Estefanía Yang  Drug(s): vancomycin  Indication: Necrotizing fasciitis (perineal/scrotal)  Age/Gender IBW DW  Allergy Information   39 y.o./M; 177.8 cm  81.6 kg  Patient has no known allergies. Date  WBC BUN/CR Drug/Dose Time   Given Level(s)   (Time) Comments    38.7 18/0.9 Unconfirmed (1000 mg x1?) (?)      42.7 15/0.7 vancomycin 1500 mg q12h 0456,                                  Estimated Creatinine Clearance: 143 mL/min (based on SCr of 0.7 mg/dL). Intake/Output Summary (Last 24 hours) at 2020 1430  Last data filed at 2020 1200  Gross per 24 hour   Intake 4702.5 ml   Output 2700 ml   Net 2002.5 ml       Temp max: Temp (24hrs), Av.8 °F (38.2 °C), Min:97.7 °F (36.5 °C), Max:102 °F (38.9 °C)      Cultures:  available culture and sensitivity results were reviewed in EPIC      Assessment:  · Consulted by Dr. Cristobal Latham to dose/monitor vancomycin. · Goal trough level:  15-20 mCg/mL. 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 · S/P I&D and penrose drain placement early  AM.    · Empiric ATBs (clinda, ceftriaxone, and vanco 1000 mg x1 on  @ 1900) started and [address] [place] [address]. Cannot confirm that ATBs were given at [address] [place] [address].  · Vanco, clinda, and pip-tazo continued after transfer to [facility]. Plan:  · Start vancomycin 1500 mg q12h. · Will check vancomycin trough level when steady state is attained if vanco continues. · Pharmacist will follow and monitor/adjust dosing as necessary.       Roc Montana PharmD  2020  2:39 PM  Pager: [phone]

## 2020-11-13 NOTE — OP NOTE
[facility]  OPERATIVE REPORT    Aron Isaac  1980      DATE OF PROCEDURE: 11/13/2020     SURGEON: Ivana Salguero MD, MSc, FACS     ASSISTANT: CHARLINE Espinoza DO, PGY-V     PREOPERATIVE DIAGNOSIS:  Necrotizing fasciitis of the right inguinal, scrotum, perineum and horseshoe abscess of the rectum. POSTOPERATIVE DIAGNOSIS: Same    OPERATION: extensive excisional debridement of the right inguinal, scrotum and perineum ([id number]) and incision and drainage of horseshoe abscess of rectum with placement of penrose     ANESTHESIA: GETA     ESTIMATED BLOOD LOSS: 493DA     COMPLICATIONS: None    SPECIMEN:  Fluid for culture    DRAINS:  none    HISTORY:  This is a 37 y.o.male who presented with several weeks of perirectal abscess that became extensive over the last few days. He presented to [facility] and was transferred here. His WBC was 50,000 and sodium 123 prior to arrival.  He was given Vanc, Clinda, rocephin prior to surgery. He understood the r/b/a to procedure and wishes to proceed. DESCRIPTION OF PROCEDURE: The patient was identified and the procedure was confirmed. Bilateral SCDs in place, lozano catheter inserted. Patient placed in lithotomy position. Cautery used to make incision in the inguinal region. The incision extended from the right inguinal, scrotum, perineum. All necrotic tissue debrided. Debridement included skin and subcutaneous tissue. The perirectal abscess was drained by making two counter incisions and passing a penrose drain. The drain was tied to itself to secure it. The wound was irrigated copiously with saline and packed with Dakin's soaked gauze. Total area of debridement was [id number] of skin and subcutaneous tissue. Needle, sponge, and instrument counts were reported as correct x2. The patient tolerated the procedure and was transferred to the recovery area in satisfactory condition.     Leodan Eduardo MD, MSc, FACS  11/13/2020  1:53 AM

## 2020-11-13 NOTE — PROGRESS NOTES
Pharmacy Consultation Note  (Antibiotic Dosing and Monitoring)    Initial consult date: 20  Consulting physician: Jamelle Osgood  Drug: Vancomycin  Indication: gangrene/wound infection    Age/  Gender Height Weight IBW Dosing weight  Allergy Information   39 y.o./male 5' 10\" (177.8 cm) 180 lb (81.6 kg)     Ideal body weight: 73 kg (160 lb 15 oz)  Adjusted ideal body weight: 76.5 kg (168 lb 9 oz)  81.6kg  Patient has no known allergies. Temp (24hrs), Av.6 °F (37 °C), Min:98.6 °F (37 °C), Max:98.6 °F (37 °C)          Date  WBC BUN SCr CrCl  (mL/min) Drug/Dose Time   Given Level(s)   (Time) Comments   20 38.7 18 0.9  112   Vancomycin 1000 mg IV  20 1900 at [facility]                                          No intake or output data in the 24 hours ending 20 0030    Historical Cultures:  No results found for: ORG  No results for input(s): BC in the last 72 hours. Cultures:  available culture and sensitivity results were reviewed in [facility]    Assessment:  · 37 y.o. male has been initiated Vancomycin.   · Estimated CrCl = 123 mL/min  · Goal trough level = 15-20 mcg/mL    Plan:  · Will initiate vancomycin at a dose of 1500mg every 12 hours  · Monitor renal function   · Clinical pharmacy to follow      NANY Gillis [facility] 2020 12:30 AM

## 2020-11-13 NOTE — ANESTHESIA PRE PROCEDURE
Department of Anesthesiology  Preprocedure Note       Name:  Caitlin Jones   Age:  37 y.o.  :  1980                                          MRN:  [id number]         Date:  2020      Surgeon: Minesh Stoddard):  Komal Soto MD    Procedure: Procedure(s):  INCISION AND DRAINAGE RIGHT GROIN AND PERINEUM    Medications prior to admission:   Prior to Admission medications    Not on File       Current medications:    Current Facility-Administered Medications   Medication Dose Route Frequency Provider Last Rate Last Dose    vancomycin 1.5 g in dextrose 5% 300 mL IVPB  1,500 mg Intravenous Q12H  Cornea, DO        morphine (PF) injection 1 mg  1 mg Intravenous Q5 Min PRN Jody Ramirez MD        morphine (PF) injection 2 mg  2 mg Intravenous Q5 Min PRN Jody Ramirez MD        HYDROmorphone (DILAUDID) injection 0.25 mg  0.25 mg Intravenous Q5 Min PRN Jody Ramirez MD        HYDROmorphone (DILAUDID) injection 0.5 mg  0.5 mg Intravenous Q5 Min PRN Jody Ramirez MD        HYDROcodone-acetaminophen (NORCO) 5-325 MG per tablet 1 tablet  1 tablet Oral PRN Jody Ramirez MD        Or    HYDROcodone-acetaminophen (NORCO) 5-325 MG per tablet 2 tablet  2 tablet Oral PRN Jody Ramirez MD        promethazine (PHENERGAN) injection 6.25 mg  6.25 mg Intravenous Q10 Min PRN Jody Ramirez MD        meperidine (DEMEROL) injection 12.5 mg  12.5 mg Intravenous Q5 Min PRN Jody Ramirez MD        0.9 % sodium chloride infusion  1,000 mL Intravenous Continuous Prema Zendejas  mL/hr at 20 0024 1,000 mL at 20 0024    clindamycin (CLEOCIN) 600 mg in dextrose 5 % 50 mL IVPB  600 mg Intravenous Q8H  Cornea,  mL/hr at 20 0024 600 mg at 20 0024    piperacillin-tazobactam (ZOSYN) 3.375 g in dextrose 5 % 100 mL IVPB extended infusion (mini-bag)  3.375 g Intravenous Q8H  Cornea, DO        Post Zosyn Flush (0.9 % sodium

## 2020-11-13 NOTE — ED NOTES
Bed: 22  Expected date:   Expected time:   Means of arrival:   Comments:  EMT     Yg Phillips RN  11/12/20 4537

## 2020-11-13 NOTE — ANESTHESIA POSTPROCEDURE EVALUATION
Department of Anesthesiology  Postprocedure Note    Patient: Nusrat Brunson  MRN: [id number]  YOB: 1980  Date of evaluation: 11/13/2020  Time:  3:06 AM     Procedure Summary     Date:  11/13/20 Room / Location:  [address] [place] [address] 20 / [facility]    Anesthesia Start:   Anesthesia Stop:      Procedure:  INCISION AND DRAINAGE RIGHT GROIN AND PERINEUM (Right ) Diagnosis:  (NECROTIZING FASCITIS)    Surgeon:  Norman Lay MD Responsible Provider:      Anesthesia Type:  Not recorded ASA Status:  Not recorded          Anesthesia Type: general    Ambar Phase I:      Ambar Phase II:      Last vitals: Reviewed and per EMR flowsheets.        Anesthesia Post Evaluation    Patient location during evaluation: PACU  Patient participation: complete - patient participated  Level of consciousness: awake  Pain score: 3  Airway patency: patent  Nausea & Vomiting: no nausea and no vomiting  Complications: no  Cardiovascular status: blood pressure returned to baseline  Respiratory status: acceptable  Hydration status: euvolemic

## 2020-11-13 NOTE — ED PROVIDER NOTES
HPI:  11/12/20,   Time: 10:25 PM FLORESITA Gonzalez is a 37 y.o. male presenting to the ED for gu infxn, beginning 2 days to 1 week ago. The complaint has been persistent, severe in severity, and worsened by nothing. Transfer, 2 day hx genital redness/swellilng/drainage. Dx with olena, sent for higher level of care, pt poor historian    Review of Systems:   Pertinent positives and negatives are stated within HPI, all other systems reviewed and are negative.          --------------------------------------------- PAST HISTORY ---------------------------------------------  Past Medical History:  has no past medical history on file. Past Surgical History:  has no past surgical history on file. Social History:      Family History: family history is not on file. The patients home medications have been reviewed. Allergies: Patient has no known allergies. ---------------------------------------------------PHYSICAL EXAM--------------------------------------    Constitutional/General: Alert and oriented x3, Head: Normocephalic and atraumatic  Eyes: PERRL, EOMI, conjunctive normal, sclera non icteric  Mouth: Oropharynx clear, handling secretions, no trismus, no asymmetry of the posterior oropharynx or uvular edema  Neck: Supple, full ROM, non tender to palpation in the midline, no stridor, no crepitus, no meningeal signs  Respiratory: Lungs clear to auscultation bilaterally, no wheezes, rales, or rhonchi. Not in respiratory distress  Cardiovascular:  Regular rate. Regular rhythm. No murmurs, gallops, or rubs. 2+ distal pulses  Chest: No chest wall tenderness  GI:  Abdomen Soft, Non tender, Non distended. : large eryhtematous swollen/inflamed testicles with erythema extending into perineum, extremely tender to palp  Musculoskeletal: Moves all extremities x 4. Warm and well perfused, no clubbing, cyanosis, or edema. Capillary refill <3 seconds  Integument: skin warm and dry. No rashes. Lymphatic: no lymphadenopathy noted  Neurologic: GCS 15, no focal deficits, symmetric strength 5/5 in the upper and lower extremities bilaterally  Psychiatric: flat Affect    -------------------------------------------------- RESULTS -------------------------------------------------  I have personally reviewed all laboratory and imaging results for this patient. Results are listed below. LABS:  No results found for this visit on 11/12/20. RADIOLOGY:  Interpreted by Radiologist.  No orders to display       EKG: This EKG is signed and interpreted by the EP. Time:   Rate:   Rhythm:   Interpretation:   Comparison:       ------------------------- NURSING NOTES AND VITALS REVIEWED ---------------------------   The nursing notes within the ED encounter and vital signs as below have been reviewed by myself. /60   Pulse 104   Temp 98.6 °F (37 °C)   Resp 14   Ht 5' 10\" (1.778 m)   Wt 180 lb (81.6 kg)   SpO2 98%   BMI 25.83 kg/m²   Oxygen Saturation Interpretation: Normal    The patients available past medical records and past encounters were reviewed. ------------------------------ ED COURSE/MEDICAL DECISION MAKING----------------------  Medications   0.9 % sodium chloride infusion (has no administration in time range)   clindamycin (CLEOCIN) 600 mg in dextrose 5 % 50 mL IVPB (has no administration in time range)   piperacillin-tazobactam (ZOSYN) 3.375 g in dextrose 5 % 100 mL IVPB extended infusion (mini-bag) (has no administration in time range)   Post Zosyn Flush (0.9 % sodium chloride infusion) (has no administration in time range)         ED COURSE:       Medical Decision Making:    Pt sent for surgical eval, surgery consulted, to or/icu      This patient's ED course included: a personal history and physicial examination    This patient has remained hemodynamically stable during their ED course. Re-Evaluations:             Re-evaluation.   Patients symptoms show no change            Consultations:             surgery    Critical Care:         Counseling: The emergency provider has spoken with the patient and discussed todays results, in addition to providing specific details for the plan of care and counseling regarding the diagnosis and prognosis. Questions are answered at this time and they are agreeable with the plan.       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  Every effort was made to ensure accuracy; however, inadvertent computerized transcription errors may be present        Sina Nunn MD  11/12/20 3674

## 2020-11-13 NOTE — PROGRESS NOTES
OCCUPATIONAL THERAPY INITIAL EVALUATION      Date:2020  Patient Name: Елена Gonzalez  MRN: [id number]  : 1980  Room: [address] [place], [place] [address]    Referring Provider: Jesica Castillo DO    Evaluating OT: Loni Mohs, OTR/L [id number]    AM-PAC Daily Activity Raw Score:     Recommended Adaptive Equipment: LB dressing AE as needed pending progress to assist with pain management       Diagnosis:  Necrotizing soft tissue infection    Surgery/Procedures:  excisional drainage of R inguinal, scrotum, perineum, I&D rectum with penrose placement     Pertinent Medical History: Ulcerative colitis    Precautions:  Falls, waffle cushion     Home Living: Pt lives with spouse  in a split level home with 4 step(s) to enter and no rail(s); bed/bath on 2nd level (4 steps with rail)  Bathroom setup: tub/shower; higher height commode   Equipment owned: no DME    Prior Level of Function: IND with ADLs;  IND with IADLs. No device for ambulation. Driving: yes  Occupation:     Pain Level: pt c/o 0/10 pain at rest; pain at surgical site when seated. Cognition: A&O: 4/4    Follows 1-2 step commands appropriately.     Memory: G   Comprehension G   Problem solving: G   Judgement/safety: G               Communication skills: WFL           Vision: WFL               Glasses: no                                                   Hearing: WFL     RASS: 0  CAM-ICU: (NT) Delirium    UE Assessment:  Hand Dominance: Right [x]  Left []     ROM Strength   RUE  WFL WFL   LUE WFL WFL     Sensation: No c/o numbness or tingling in extremities   Tone: WNL   Edema: [facility]     Functional Assessment   Initial Eval Status  Date:  Treatment Status  Date: STG=LTG  5-7 days   Feeding IND                        IND  while seated up in chair to increase activity tolerance        Grooming SBA  standing sink level                        IND   while standing sink level requiring no device for balance and demonstrating G tolerance      UB dressing/bathing Min A                        IND       LB dressing/bathing Max A  Seated  (painful mobility; decreased reach)                        Suzie   using AE as needed for safe reach/ energy conservation       Toileting NT                        Suzie     Bed Mobility  Supine to sit: SBA with  HOB elevated    Sit to supine: NT                        IND  in prep of ADL tasks & transfers   Functional Transfers Sit to stand: CGA    Stand to sit: CGA                        Suzie  sit<>stand/functional bathroom transfers using AD/DME as needed for balance and safety   Functional Mobility CGA  No device; occasional unsteadiness                        Suzie   functional/bathroom mobility using AD as needed & demonstrating G safety     Balance Sitting:     Static:  S    Dynamic:SBA  Standing: CGA-SBA  Suzie dynamic sitting balance; Suzie dynamic standing balance  during ADL tasks & transfers   Endurance/Activity Tolerance   F tolerance with moderate activity.    G   tolerance with moderate activity/self care routine   Visual/  Perceptual   WFL                       Vitals:  Heart Rate at rest 60 bpm Heart Rate post session 59 bpm   SpO2 at rest 96% SpO2 post session 95%   Blood Pressure at rest 101/57 mmHg Blood Pressure post session 110/52 mmHg      Assessment of current deficits   [x]Functional mobility   [x]ADLs [x]Strength  []Cognition  [x]Functional transfers  [x]IADLs [x]Safety Awareness  [x]Endurance  []Fine Motor Coordination  [x]Balance      []Vision/perception  []Sensation    []Gross Motor Coordination  [x]ROM  []Delirium                  []Communication      Plan of Care: 1-3 days/week for 1-2 weeks PRN   [x]ADL retraining/adapted techniques and AE recommendations to increase functional independence within precautions                    [x]Energy conservation techniques to improve tolerance for selfcare routine   [x]Functional transfer/mobility training/DME recommendations for increased independence, safety and fall prevention         [x]Patient/family education to increase safety and functional independence             []Environmental modifications for safe mobility and completion of ADLs                             []Cognitive retraining ex's to improve problem solving skills & safe participation in ADLs/IADLs     []Sensory re-education techniques to improve extremity awareness, maintain skin integrity and improve hand function                             []Visual/Perceptual retraining  to improve body awareness and safety during transfers and ADLs  []Splinting/positioning needs to maintain joint/skin integrity and prevent contractures  [x]Therapeutic activity to improve functional performance during ADLs. [x]Therapeutic exercise to improve tolerance and functional strength for ADLs   [x]Balance retraining/tolerance tasks for facilitation of postural control with dynamic challenges during ADLs . []Neuromuscular re-education: facilitation of righting/equilibrium reactions, midline orientation, scapular stability/mobility, Normalization muscle     tone and facilitation active functional movement/Attention                         []Delirium prevention/treatment    []Positioning to improve functional independence  []Other:       Treatment: OT intervention provided to achieve goals:   Bed mobility: Instruction on precautions prior to bed mobility to facilitate safe transfers and ADLS. Pt demo G follow through. Balance retraining: Performed standing balance ex's to improve righting reactions with postural changes during ADLS. Pt stood at sink with G tolerance and no LOB during tasks. ADL retraining: Instruction on adapted techniques/AE introducted to increase independence and safe reach during dressing/bathing activities. Pt demonstrated discomfort sitting on waffle cushion in chair to perform tasks. Pt may benefit from AE or standing to complete ADL routine.     Line [id number]     Non-Billable Time        Evaluation time includes thorough review of current medical information, gathering information on past medical history/social history and prior level of function, completion of standardized testing/informal observation of tasks, assessment of data and development of POC/Goals.      Jonn Marino, OTR/L [id number]

## 2020-11-14 LAB
ALBUMIN SERPL-MCNC: 2.1 G/DL (ref 3.5–5.2)
ALP BLD-CCNC: 160 U/L (ref 40–129)
ALT SERPL-CCNC: 15 U/L (ref 0–40)
ANION GAP SERPL CALCULATED.3IONS-SCNC: 9 MMOL/L (ref 7–16)
AST SERPL-CCNC: 26 U/L (ref 0–39)
BASOPHILS ABSOLUTE: 0 E9/L (ref 0–0.2)
BASOPHILS RELATIVE PERCENT: 0.4 % (ref 0–2)
BILIRUB SERPL-MCNC: 1.9 MG/DL (ref 0–1.2)
BUN BLDV-MCNC: 17 MG/DL (ref 6–20)
BURR CELLS: ABNORMAL
CALCIUM SERPL-MCNC: 8 MG/DL (ref 8.6–10.2)
CHLORIDE BLD-SCNC: 101 MMOL/L (ref 98–107)
CO2: 27 MMOL/L (ref 22–29)
CREAT SERPL-MCNC: 0.8 MG/DL (ref 0.7–1.2)
EOSINOPHILS ABSOLUTE: 0 E9/L (ref 0.05–0.5)
EOSINOPHILS RELATIVE PERCENT: 0 % (ref 0–6)
GFR AFRICAN AMERICAN: >60
GFR NON-AFRICAN AMERICAN: >60 ML/MIN/1.73
GLUCOSE BLD-MCNC: 170 MG/DL (ref 74–99)
HCT VFR BLD CALC: 29.8 % (ref 37–54)
HEMOGLOBIN: 10.1 G/DL (ref 12.5–16.5)
HYPOCHROMIA: ABNORMAL
LYMPHOCYTES ABSOLUTE: 1.55 E9/L (ref 1.5–4)
LYMPHOCYTES RELATIVE PERCENT: 3.5 % (ref 20–42)
MAGNESIUM: 2.3 MG/DL (ref 1.6–2.6)
MCH RBC QN AUTO: 28.5 PG (ref 26–35)
MCHC RBC AUTO-ENTMCNC: 33.9 % (ref 32–34.5)
MCV RBC AUTO: 83.9 FL (ref 80–99.9)
MONOCYTES ABSOLUTE: 3.1 E9/L (ref 0.1–0.95)
MONOCYTES RELATIVE PERCENT: 7.8 % (ref 2–12)
MRSA CULTURE ONLY: NORMAL
MYELOCYTE PERCENT: 0.9 % (ref 0–0)
NEUTROPHILS ABSOLUTE: 34.44 E9/L (ref 1.8–7.3)
NEUTROPHILS RELATIVE PERCENT: 87.8 % (ref 43–80)
OVALOCYTES: ABNORMAL
PDW BLD-RTO: 15.3 FL (ref 11.5–15)
PHOSPHORUS: 3.8 MG/DL (ref 2.5–4.5)
PLATELET # BLD: 410 E9/L (ref 130–450)
PMV BLD AUTO: 11 FL (ref 7–12)
POIKILOCYTES: ABNORMAL
POLYCHROMASIA: ABNORMAL
POTASSIUM SERPL-SCNC: 4.3 MMOL/L (ref 3.5–5)
RBC # BLD: 3.55 E12/L (ref 3.8–5.8)
SODIUM BLD-SCNC: 137 MMOL/L (ref 132–146)
TOTAL PROTEIN: 5.1 G/DL (ref 6.4–8.3)
VANCOMYCIN TROUGH: 13.8 MCG/ML (ref 5–16)
WBC # BLD: 38.7 E9/L (ref 4.5–11.5)

## 2020-11-14 PROCEDURE — 6370000000 HC RX 637 (ALT 250 FOR IP): Performed by: SURGERY

## 2020-11-14 PROCEDURE — 6370000000 HC RX 637 (ALT 250 FOR IP): Performed by: STUDENT IN AN ORGANIZED HEALTH CARE EDUCATION/TRAINING PROGRAM

## 2020-11-14 PROCEDURE — 99291 CRITICAL CARE FIRST HOUR: CPT | Performed by: SURGERY

## 2020-11-14 PROCEDURE — 2500000003 HC RX 250 WO HCPCS: Performed by: SURGERY

## 2020-11-14 PROCEDURE — 6360000002 HC RX W HCPCS: Performed by: SURGERY

## 2020-11-14 PROCEDURE — 84100 ASSAY OF PHOSPHORUS: CPT

## 2020-11-14 PROCEDURE — 36415 COLL VENOUS BLD VENIPUNCTURE: CPT

## 2020-11-14 PROCEDURE — 2580000003 HC RX 258: Performed by: STUDENT IN AN ORGANIZED HEALTH CARE EDUCATION/TRAINING PROGRAM

## 2020-11-14 PROCEDURE — 80053 COMPREHEN METABOLIC PANEL: CPT

## 2020-11-14 PROCEDURE — 6360000002 HC RX W HCPCS: Performed by: STUDENT IN AN ORGANIZED HEALTH CARE EDUCATION/TRAINING PROGRAM

## 2020-11-14 PROCEDURE — 80202 ASSAY OF VANCOMYCIN: CPT

## 2020-11-14 PROCEDURE — 85025 COMPLETE CBC W/AUTO DIFF WBC: CPT

## 2020-11-14 PROCEDURE — 83735 ASSAY OF MAGNESIUM: CPT

## 2020-11-14 PROCEDURE — 2000000000 HC ICU R&B

## 2020-11-14 RX ORDER — ASCORBIC ACID 500 MG
500 TABLET ORAL DAILY
Status: DISCONTINUED | OUTPATIENT
Start: 2020-11-14 | End: 2020-11-22 | Stop reason: HOSPADM

## 2020-11-14 RX ORDER — ZINC SULFATE 50(220)MG
50 CAPSULE ORAL DAILY
Status: DISCONTINUED | OUTPATIENT
Start: 2020-11-14 | End: 2020-11-22 | Stop reason: HOSPADM

## 2020-11-14 RX ADMIN — BUPRENORPHINE HYDROCHLORIDE AND NALOXONE HYDROCHLORIDE DIHYDRATE 1 TABLET: 8; 2 TABLET SUBLINGUAL at 20:27

## 2020-11-14 RX ADMIN — SODIUM CHLORIDE: 9 INJECTION, SOLUTION INTRAVENOUS at 16:11

## 2020-11-14 RX ADMIN — PIPERACILLIN AND TAZOBACTAM 3.38 G: 3; .375 INJECTION, POWDER, LYOPHILIZED, FOR SOLUTION INTRAVENOUS at 04:17

## 2020-11-14 RX ADMIN — HYOSCYAMINE SULFATE: 16 SOLUTION at 07:54

## 2020-11-14 RX ADMIN — SODIUM CHLORIDE: 9 INJECTION, SOLUTION INTRAVENOUS at 09:04

## 2020-11-14 RX ADMIN — BUPRENORPHINE HYDROCHLORIDE AND NALOXONE HYDROCHLORIDE DIHYDRATE 1 TABLET: 8; 2 TABLET SUBLINGUAL at 09:04

## 2020-11-14 RX ADMIN — PIPERACILLIN AND TAZOBACTAM 3.38 G: 3; .375 INJECTION, POWDER, LYOPHILIZED, FOR SOLUTION INTRAVENOUS at 12:34

## 2020-11-14 RX ADMIN — SODIUM CHLORIDE: 9 INJECTION, SOLUTION INTRAVENOUS at 01:23

## 2020-11-14 RX ADMIN — FENTANYL CITRATE 100 MCG: 50 INJECTION, SOLUTION INTRAMUSCULAR; INTRAVENOUS at 06:32

## 2020-11-14 RX ADMIN — Medication 10 ML: at 09:12

## 2020-11-14 RX ADMIN — CLINDAMYCIN PHOSPHATE 900 MG: 900 INJECTION, SOLUTION INTRAVENOUS at 16:05

## 2020-11-14 RX ADMIN — ACETAMINOPHEN 650 MG: 325 TABLET ORAL at 20:27

## 2020-11-14 RX ADMIN — CLINDAMYCIN PHOSPHATE 900 MG: 900 INJECTION, SOLUTION INTRAVENOUS at 07:50

## 2020-11-14 RX ADMIN — SODIUM CHLORIDE, POTASSIUM CHLORIDE, SODIUM LACTATE AND CALCIUM CHLORIDE: 600; 310; 30; 20 INJECTION, SOLUTION INTRAVENOUS at 00:16

## 2020-11-14 RX ADMIN — VANCOMYCIN HYDROCHLORIDE 1.5 G: 10 INJECTION, POWDER, LYOPHILIZED, FOR SOLUTION INTRAVENOUS at 05:30

## 2020-11-14 RX ADMIN — ENOXAPARIN SODIUM 40 MG: 40 INJECTION SUBCUTANEOUS at 09:04

## 2020-11-14 RX ADMIN — Medication 50 MG: at 19:00

## 2020-11-14 RX ADMIN — CLINDAMYCIN PHOSPHATE 900 MG: 900 INJECTION, SOLUTION INTRAVENOUS at 00:18

## 2020-11-14 RX ADMIN — VANCOMYCIN HYDROCHLORIDE 1.5 G: 10 INJECTION, POWDER, LYOPHILIZED, FOR SOLUTION INTRAVENOUS at 17:33

## 2020-11-14 RX ADMIN — OXYCODONE HYDROCHLORIDE AND ACETAMINOPHEN 500 MG: 500 TABLET ORAL at 19:00

## 2020-11-14 RX ADMIN — Medication 10 ML: at 20:28

## 2020-11-14 RX ADMIN — PIPERACILLIN AND TAZOBACTAM 3.38 G: 3; .375 INJECTION, POWDER, LYOPHILIZED, FOR SOLUTION INTRAVENOUS at 20:28

## 2020-11-14 RX ADMIN — SODIUM CHLORIDE, POTASSIUM CHLORIDE, SODIUM LACTATE AND CALCIUM CHLORIDE: 600; 310; 30; 20 INJECTION, SOLUTION INTRAVENOUS at 05:21

## 2020-11-14 RX ADMIN — ACETAMINOPHEN 650 MG: 325 TABLET ORAL at 09:04

## 2020-11-14 RX ADMIN — ACETAMINOPHEN 650 MG: 325 TABLET ORAL at 16:05

## 2020-11-14 ASSESSMENT — PAIN DESCRIPTION - ORIENTATION
ORIENTATION: RIGHT
ORIENTATION: RIGHT

## 2020-11-14 ASSESSMENT — PAIN DESCRIPTION - PROGRESSION: CLINICAL_PROGRESSION: NOT CHANGED

## 2020-11-14 ASSESSMENT — PAIN DESCRIPTION - LOCATION
LOCATION: PERINEUM

## 2020-11-14 ASSESSMENT — PAIN DESCRIPTION - PAIN TYPE
TYPE: ACUTE PAIN
TYPE: ACUTE PAIN;SURGICAL PAIN
TYPE: ACUTE PAIN

## 2020-11-14 ASSESSMENT — PAIN SCALES - GENERAL
PAINLEVEL_OUTOF10: 2
PAINLEVEL_OUTOF10: 0
PAINLEVEL_OUTOF10: 2
PAINLEVEL_OUTOF10: 8
PAINLEVEL_OUTOF10: 0
PAINLEVEL_OUTOF10: 2

## 2020-11-14 ASSESSMENT — PAIN DESCRIPTION - FREQUENCY
FREQUENCY: CONTINUOUS
FREQUENCY: CONTINUOUS

## 2020-11-14 ASSESSMENT — PAIN DESCRIPTION - DESCRIPTORS
DESCRIPTORS: ACHING;SORE
DESCRIPTORS: ACHING;DISCOMFORT;SORE

## 2020-11-14 ASSESSMENT — PAIN DESCRIPTION - ONSET: ONSET: ON-GOING

## 2020-11-14 NOTE — PROGRESS NOTES
Dr. Nguyễn Jaquez notified of MAP in 46s while pt. Resting. Pt. Is easily arousable and alert and oriented when awoken. U/o is adequate. She states this is ok and to continue to monitor.

## 2020-11-14 NOTE — PROGRESS NOTES
[facility]  PROGRESS NOTE  ATTENDING NOTE    CRITICAL CARE    Chief Complaint   Patient presents with    Wound Check     Gangrene to scrotom and anal abscess transfer from [facility]  Patsy Duran is a 37 y.o. male who presents for evaluation of scrotal edema, pain and drainage. Patient states that he started having perianal drainage a week ago and that it stopped spontaneously, then over the last several days the drainage worsened and he developed significant scrotal edema, erythema, and pain. Patient is not a known diabetic, he states that he has a history of Ulcerative Colitis but has not seen anyone in regards to this for several years. He states \"I think it may be a fistula\". Chart reviewed from [facility]: WBC 50, Na 123, Cr 1.56, Glc 117. 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 HENT:      Head: Normocephalic and atraumatic. Nose: Nose normal.      Mouth/Throat:      Mouth: Mucous membranes are moist.      Pharynx: Oropharynx is clear. Eyes:      Extraocular Movements: Extraocular movements intact. Pupils: Pupils are equal, round, and reactive to light. Neck:      Musculoskeletal: Normal range of motion and neck supple. Cardiovascular:      Rate and Rhythm: Normal rate and regular rhythm. Pulses: Normal pulses. Heart sounds: Normal heart sounds. Pulmonary:      Effort: Pulmonary effort is normal.      Breath sounds: Normal breath sounds.    Abdominal:      General: There is no distension. Palpations: Abdomen is soft. Tenderness: There is no abdominal tenderness. Musculoskeletal:      Right lower leg: No edema. Left lower leg: No edema. Skin:     General: Skin is warm and dry. Comments: Image reviewed of wound resident took--some exudate, but no further necrotic tissue. Some induration still present   Neurological:      General: No focal deficit present. Mental Status: He is alert and oriented to person, place, and time. Psychiatric:         Mood and Affect: Mood normal.         Behavior: Behavior normal.         Thought Content: Thought content normal.         Judgment: Judgment normal.         Lines: Lozano:  yes - Continue lozano catheter for managing strict I and Os in this critically ill patient. Central line:  no  PICC:  no    CAM-ICU:  negative  RASS:  RASS 0 (Alert and Calm)    ASSESSMENT/PLAN:  1. Perineal/groin/horseshoe abscess--s/p I&D and penrose drainage; c/w Antibiotics  2. H/o narcotic abuse--c/w suboxone  3. Sepsis d/t abscess--c/w antibiotics  4. Acute hyponatremia--resolved  5. Hypoalbuminemia--encourage PO intake, nutritional supplements    DVT/GI ppx--lovenox, diet    CC TIME:  I spent 41 min managing this patients critical issues which are a constant threat to life excluding time teaching and performing procedures.       Kell Lima MD, MSc, FACS  11/14/2020  6:03 PM

## 2020-11-14 NOTE — PROGRESS NOTES
Pharmacy Consultation Note  (Antibiotic Dosing and Monitoring)    Initial consult date: 2020  Consulting physician: Dr. Jose Youngblood  Drug(s): vancomycin  Indication: Necrotizing fasciitis (perineal/scrotal)  Age/Gender IBW DW  Allergy Information   39 y.o./M; 177.8 cm  81.6 kg  Patient has no known allergies. Date  WBC BUN/CR Drug/Dose Time   Given Level(s)   (Time) Comments    38.7 18/0.9 Unconfirmed (1000 mg x1?) (?)      42.7 15/0.7 vancomycin 1500 mg q12h 0456, 1648      38.7 17/0.8 vanco 1500 mg q12h 0530, <1700> Trough @ 1630                        Estimated Creatinine Clearance: 125 mL/min (based on SCr of 0.8 mg/dL). Intake/Output Summary (Last 24 hours) at 2020 1051  Last data filed at 2020 0908  Gross per 24 hour   Intake 3279 ml   Output 2235 ml   Net 1044 ml   UO = 1.3 mL/kg/hr (2580 mL)    Temp max: Temp (24hrs), Av.9 °F (36.6 °C), Min:97.6 °F (36.4 °C), Max:98.4 °F (36.9 °C)      Cultures:  available culture and sensitivity results were reviewed in EPIC    Nares: negative for MRSA ( @ 1034)    Groin: abund PMNs; mod GNR, mod GPC clusters, abun GP diplococci ( @ 4319)    Blood (x2): NGTD ( @ 0335)       Assessment:  · Consulted by Dr. Maria Celeste to dose/monitor vancomycin. · Goal trough level:  15-20 mCg/mL. · 38 yo/M transferred from [address] [place] [address] for worsening perineal abscess progressing to necrotizing fasciitis/Ann's gangrene. · S/P I&D and penrose drain placement early  AM.    · Empiric ATBs (clinda, ceftriaxone, and vanco 1000 mg x1 on  @ 1900) started and [address] [place] [address]. Cannot confirm that ATBs were given at [address] [place] [address].  · Vanco, clinda, and pip-tazo continued after transfer to [facility]. · 11: day #2 vanco, clinda, pip-tazo. Cx's as above. Good UO documented, WBC decreasing, afebrile. Dressing changed today. Plan:  · Continue vancomycin 1500 mg q12h.   · Will check vancomycin trough level tonight; HOLD dose if > 20 mCg/mL. · Pharmacist will follow and monitor/adjust dosing as necessary.       Silvio Terry PharmEYAL  11/14/2020  10:51 AM  Pager: [phone]

## 2020-11-14 NOTE — PLAN OF CARE
Problem: Anxiety/Stress:  Goal: Level of anxiety will decrease  Description: Level of anxiety will decrease  Outcome: Met This Shift     Problem: Aspiration:  Goal: Absence of aspiration  Description: Absence of aspiration  Outcome: Met This Shift     Problem: Cardiac Output - Decreased:  Goal: Hemodynamic stability will improve  Description: Hemodynamic stability will improve  Outcome: Met This Shift     Problem: Pain:  Description: Pain management should include both nonpharmacologic and pharmacologic interventions. Goal: Pain level will decrease  Description: Pain level will decrease  Outcome: Met This Shift     Problem: Pain:  Description: Pain management should include both nonpharmacologic and pharmacologic interventions. Goal: Recognizes and communicates pain  Description: Recognizes and communicates pain  Outcome: Met This Shift     Problem: Pain:  Description: Pain management should include both nonpharmacologic and pharmacologic interventions. Goal: Pain level will decrease  Description: Pain level will decrease  Outcome: Met This Shift     Discussed plan of care with patient/family. Patient/family incorporated into plan of care.

## 2020-11-14 NOTE — DISCHARGE SUMMARY
Physician Discharge Summary     Patient ID:  Johnson Morales  [id number]  54 y.o.  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 *      Admission Condition: serious    Discharged Condition: stable    Indication for Admission: necrotizing fasciitis    Hospital Course/Procedures/Operation/treatments:   11/13: Necrotizing fasciitis of the right inguinal, perineum, scrotum and perianal.  He has a perianal abscess as well. He has a h/o UC and drug abuse. He does not see anyone or receive treatment for his UC. To OR for excisional debridement. He understands the r/b/a to procedure and that this will be a big incision and he will likely need a 2nd procedure. 11/14: Dressing change. Some superficial purulence. Overall very stable. White blood cell count coming down. Hemodynamically stable. 11/15: Worsening erythema around wound in right groin. Needs repeat debridement. NPO for OR. Ok to transfer out of SICU  11/16: Still with persistent erythema at right lateral aspect of groin, some purulence along wound edge, transferred from SICU, no fevers, pain controlled  11/17: R groin erythema improving but with area of tenderness and fluctuance. Will proceed with repeat I&D today. 11/18: Pt is doing well after his repeat I&D yesterday. He states his pain is controlled and he feels a lot of relief after having the wound drained. Wound care to see and change the packing today.  Discharge planning is ongoing with social work and possible home health care. 11/19: Pt states his pain is controlled, reports there has been some drainage which appears to be serosanguinous. Possible discharge home today with [facility] although patient states he prefers if there is not a person coming to his home, likely needs education on wound dressing care. 11/20: No issues overnight, continues to have good pain control. Pt reports today he would like to have [facility] come in to help with wound dressing changes, states his wife Estella Sheehan has found a [address] that would work with his insurance and she will call later today. 11/21: Patient did in fact shower yesterday but states he was unable to manage the dressing change. He is requesting for his wife to come today to learn how to do it and then be discharged. 11/22- no acute events overnight. Doing well. Will work with nurseing to have teaching with wife today    Consults:   IP CONSULT TO GENERAL SURGERY  IP CONSULT TO PHARMACY  IP CONSULT TO CASE MANAGEMENT  IP CONSULT TO INFECTIOUS DISEASES    Significant Diagnostic Studies:   No results found. Discharge Exam:  @/76   Pulse 76   Temp 97.6 °F (36.4 °C) (Temporal)   Resp 16   Ht 5' 10\" (1.778 m)   Wt 184 lb 7 oz (83.7 kg)   SpO2 97%   BMI 26.46 kg/m² @     Physical -      Gen: NAD  Resp: Breathing Comfortably, no resp distress  CV: Reg Rate  Abd: SNT  EXT R thigh medial groin, wounds with fairly clean margins, erythema is improved,        Disposition: home    In process/preliminary results:  Outstanding Order Results     No orders found from 10/14/2020 to 11/13/2020.           Patient Instructions:   Discharge Medication List as of 11/22/2020  4:57 PM           Details   ciprofloxacin (CIPRO) 500 MG tablet Take 1 tablet by mouth every 12 hours for 14 days, Disp-28 tablet,R-0Print      amoxicillin-clavulanate (AUGMENTIN XR) 1000-62.5 MG per extended release tablet Take 2 tablets by mouth every 12 hours for 14 days, Disp-56 tablet,R-1Print fluconazole (DIFLUCAN) 200 MG tablet Take 2 tablets by mouth daily for 14 days, Disp-28 tablet,R-0Print              Details   oxyCODONE (ROXICODONE) 5 MG immediate release tablet Take 1 tablet by mouth every 6 hours as needed for Pain for up to 7 days. , Disp-28 tablet,R-0Print              Details   buprenorphine-naloxone (SUBOXONE) 8-2 MG SUBL SL tablet Place 1 tablet under the tongue 2 times daily. Historical Med                   SURGERY DISCHARGE INSTRUCTIONS      · FOLLOW UP: Call office to schedule follow-up appointment in 1 week. · DIET: Advance your diet as tolerated. Start with light diet and progress to your normal diet as you feel like eating. If you experience nausea or repeated episodes of vomiting which persist beyond 12-24 hours, notify your doctor. · ACTIVITY: Rest today. Increase activity gradually. No driving while on prescription pain medication. No heavy lifting for 4 weeks - nothing over 10 lbs, or heavier than a milk jug. .    · WOUND CARE: You MUST shower daily, clean the entire wound with soap and water and pack the entire wound bed with wet kerlix wraps. Keep wound covered and clean at all times    · MEDICATIONS: Take as prescribed. You may take over the counter ibuprofen or tylenol for pain as directed, limit total amount of acetaminophen (tylenol) to 3 grams per 24-hour period. Okay to resume anticoagulant medication after 24hrs. You may experience constipation while taking pain medication, you may take over the counter stool softeners (Docusate/Colace or Senokot-S) as directed.        SPECIAL INSTRUCTIONS:   Call physician if they or any other problems occur:  - Call the office if you have a fever over >101F, or if your incision becomes red, tender, or drains more than a small amount of clear fluid  - Fever over 101°    - Redness, swelling, hardness or warmth at the operative site  - Unrelieved nausea    - Foul smelling or cloudy drainage at the operative site   - Unrelieved pain    - Blood soaked dressing (Some oozing may be normal)        Follow up:   Dinesh Boles MD  [facility] [id number]    In 1 week  For wound re-check    Qasim Montero MD  [place] [address] & [address] [place], [place] [address] [id number]      to establish care       Signed:  Shanna Franco DO  11/27/2020  7:27 AM

## 2020-11-15 ENCOUNTER — ANESTHESIA (OUTPATIENT)
Dept: OPERATING ROOM | Age: 40
DRG: 853 | End: 2020-11-15
Payer: COMMERCIAL

## 2020-11-15 ENCOUNTER — ANESTHESIA EVENT (OUTPATIENT)
Dept: OPERATING ROOM | Age: 40
DRG: 853 | End: 2020-11-15
Payer: COMMERCIAL

## 2020-11-15 VITALS — DIASTOLIC BLOOD PRESSURE: 65 MMHG | SYSTOLIC BLOOD PRESSURE: 112 MMHG | TEMPERATURE: 99.9 F | OXYGEN SATURATION: 82 %

## 2020-11-15 LAB
ALBUMIN SERPL-MCNC: 2.3 G/DL (ref 3.5–5.2)
ALP BLD-CCNC: 154 U/L (ref 40–129)
ALT SERPL-CCNC: 19 U/L (ref 0–40)
ANION GAP SERPL CALCULATED.3IONS-SCNC: 10 MMOL/L (ref 7–16)
ANISOCYTOSIS: ABNORMAL
AST SERPL-CCNC: 30 U/L (ref 0–39)
BASOPHILS ABSOLUTE: 0 E9/L (ref 0–0.2)
BASOPHILS RELATIVE PERCENT: 0.3 % (ref 0–2)
BILIRUB SERPL-MCNC: 1.2 MG/DL (ref 0–1.2)
BLASTS RELATIVE PERCENT: 1.7 % (ref 0–0)
BUN BLDV-MCNC: 13 MG/DL (ref 6–20)
CALCIUM SERPL-MCNC: 8.2 MG/DL (ref 8.6–10.2)
CHLORIDE BLD-SCNC: 101 MMOL/L (ref 98–107)
CO2: 28 MMOL/L (ref 22–29)
CREAT SERPL-MCNC: 0.8 MG/DL (ref 0.7–1.2)
CULTURE SURGICAL: ABNORMAL
CULTURE SURGICAL: ABNORMAL
EOSINOPHILS ABSOLUTE: 0.23 E9/L (ref 0.05–0.5)
EOSINOPHILS RELATIVE PERCENT: 0.9 % (ref 0–6)
GFR AFRICAN AMERICAN: >60
GFR NON-AFRICAN AMERICAN: >60 ML/MIN/1.73
GLUCOSE BLD-MCNC: 103 MG/DL (ref 74–99)
HCT VFR BLD CALC: 30.8 % (ref 37–54)
HEMOGLOBIN: 10 G/DL (ref 12.5–16.5)
HYPOCHROMIA: ABNORMAL
LYMPHOCYTES ABSOLUTE: 3.51 E9/L (ref 1.5–4)
LYMPHOCYTES RELATIVE PERCENT: 13.9 % (ref 20–42)
MAGNESIUM: 2 MG/DL (ref 1.6–2.6)
MCH RBC QN AUTO: 28.1 PG (ref 26–35)
MCHC RBC AUTO-ENTMCNC: 32.5 % (ref 32–34.5)
MCV RBC AUTO: 86.5 FL (ref 80–99.9)
MONOCYTES ABSOLUTE: 0.5 E9/L (ref 0.1–0.95)
MONOCYTES RELATIVE PERCENT: 1.7 % (ref 2–12)
NEUTROPHILS ABSOLUTE: 20.58 E9/L (ref 1.8–7.3)
NEUTROPHILS RELATIVE PERCENT: 81.7 % (ref 43–80)
ORGANISM: ABNORMAL
ORGANISM: ABNORMAL
OVALOCYTES: ABNORMAL
PDW BLD-RTO: 16 FL (ref 11.5–15)
PHOSPHORUS: 3.1 MG/DL (ref 2.5–4.5)
PLATELET # BLD: 523 E9/L (ref 130–450)
PMV BLD AUTO: 10.7 FL (ref 7–12)
POIKILOCYTES: ABNORMAL
POTASSIUM SERPL-SCNC: 3.9 MMOL/L (ref 3.5–5)
RBC # BLD: 3.56 E12/L (ref 3.8–5.8)
SODIUM BLD-SCNC: 139 MMOL/L (ref 132–146)
TARGET CELLS: ABNORMAL
TOTAL PROTEIN: 5.5 G/DL (ref 6.4–8.3)
VACUOLATED NEUTROPHILS: ABNORMAL
WBC # BLD: 25.1 E9/L (ref 4.5–11.5)

## 2020-11-15 PROCEDURE — 99232 SBSQ HOSP IP/OBS MODERATE 35: CPT | Performed by: SURGERY

## 2020-11-15 PROCEDURE — 80053 COMPREHEN METABOLIC PANEL: CPT

## 2020-11-15 PROCEDURE — 84100 ASSAY OF PHOSPHORUS: CPT

## 2020-11-15 PROCEDURE — 2580000003 HC RX 258: Performed by: SURGERY

## 2020-11-15 PROCEDURE — 3600000012 HC SURGERY LEVEL 2 ADDTL 15MIN: Performed by: SURGERY

## 2020-11-15 PROCEDURE — 6360000002 HC RX W HCPCS: Performed by: SURGERY

## 2020-11-15 PROCEDURE — 2709999900 HC NON-CHARGEABLE SUPPLY: Performed by: SURGERY

## 2020-11-15 PROCEDURE — 0JB70ZZ EXCISION OF BACK SUBCUTANEOUS TISSUE AND FASCIA, OPEN APPROACH: ICD-10-PCS | Performed by: SURGERY

## 2020-11-15 PROCEDURE — 7100000001 HC PACU RECOVERY - ADDTL 15 MIN: Performed by: SURGERY

## 2020-11-15 PROCEDURE — 11004 DBRDMT SKIN XTRNL GENT&PER: CPT | Performed by: SURGERY

## 2020-11-15 PROCEDURE — 1200000000 HC SEMI PRIVATE

## 2020-11-15 PROCEDURE — 85025 COMPLETE CBC W/AUTO DIFF WBC: CPT

## 2020-11-15 PROCEDURE — 83735 ASSAY OF MAGNESIUM: CPT

## 2020-11-15 PROCEDURE — 6370000000 HC RX 637 (ALT 250 FOR IP): Performed by: SURGERY

## 2020-11-15 PROCEDURE — 6360000002 HC RX W HCPCS: Performed by: INTERNAL MEDICINE

## 2020-11-15 PROCEDURE — 2580000003 HC RX 258

## 2020-11-15 PROCEDURE — 3700000001 HC ADD 15 MINUTES (ANESTHESIA): Performed by: SURGERY

## 2020-11-15 PROCEDURE — 6360000002 HC RX W HCPCS: Performed by: ANESTHESIOLOGY

## 2020-11-15 PROCEDURE — 3600000002 HC SURGERY LEVEL 2 BASE: Performed by: SURGERY

## 2020-11-15 PROCEDURE — 7100000000 HC PACU RECOVERY - FIRST 15 MIN: Performed by: SURGERY

## 2020-11-15 PROCEDURE — 2580000003 HC RX 258: Performed by: STUDENT IN AN ORGANIZED HEALTH CARE EDUCATION/TRAINING PROGRAM

## 2020-11-15 PROCEDURE — 88304 TISSUE EXAM BY PATHOLOGIST: CPT

## 2020-11-15 PROCEDURE — 6360000002 HC RX W HCPCS: Performed by: STUDENT IN AN ORGANIZED HEALTH CARE EDUCATION/TRAINING PROGRAM

## 2020-11-15 PROCEDURE — 2500000003 HC RX 250 WO HCPCS

## 2020-11-15 PROCEDURE — 2500000003 HC RX 250 WO HCPCS: Performed by: SURGERY

## 2020-11-15 PROCEDURE — 6360000002 HC RX W HCPCS

## 2020-11-15 PROCEDURE — 6370000000 HC RX 637 (ALT 250 FOR IP): Performed by: STUDENT IN AN ORGANIZED HEALTH CARE EDUCATION/TRAINING PROGRAM

## 2020-11-15 PROCEDURE — 3700000000 HC ANESTHESIA ATTENDED CARE: Performed by: SURGERY

## 2020-11-15 RX ORDER — NEOSTIGMINE METHYLSULFATE 1 MG/ML
INJECTION, SOLUTION INTRAVENOUS PRN
Status: DISCONTINUED | OUTPATIENT
Start: 2020-11-15 | End: 2020-11-15 | Stop reason: SDUPTHER

## 2020-11-15 RX ORDER — ONDANSETRON 2 MG/ML
4 INJECTION INTRAMUSCULAR; INTRAVENOUS
Status: DISCONTINUED | OUTPATIENT
Start: 2020-11-15 | End: 2020-11-15 | Stop reason: HOSPADM

## 2020-11-15 RX ORDER — MORPHINE SULFATE 2 MG/ML
2 INJECTION, SOLUTION INTRAMUSCULAR; INTRAVENOUS EVERY 5 MIN PRN
Status: DISCONTINUED | OUTPATIENT
Start: 2020-11-15 | End: 2020-11-15 | Stop reason: HOSPADM

## 2020-11-15 RX ORDER — PROPOFOL 10 MG/ML
INJECTION, EMULSION INTRAVENOUS PRN
Status: DISCONTINUED | OUTPATIENT
Start: 2020-11-15 | End: 2020-11-15 | Stop reason: SDUPTHER

## 2020-11-15 RX ORDER — MIDAZOLAM HYDROCHLORIDE 1 MG/ML
INJECTION INTRAMUSCULAR; INTRAVENOUS PRN
Status: DISCONTINUED | OUTPATIENT
Start: 2020-11-15 | End: 2020-11-15 | Stop reason: SDUPTHER

## 2020-11-15 RX ORDER — FLUCONAZOLE 2 MG/ML
400 INJECTION, SOLUTION INTRAVENOUS EVERY 24 HOURS
Status: DISCONTINUED | OUTPATIENT
Start: 2020-11-15 | End: 2020-11-19

## 2020-11-15 RX ORDER — FENTANYL CITRATE 50 UG/ML
INJECTION, SOLUTION INTRAMUSCULAR; INTRAVENOUS PRN
Status: DISCONTINUED | OUTPATIENT
Start: 2020-11-15 | End: 2020-11-15 | Stop reason: SDUPTHER

## 2020-11-15 RX ORDER — ROCURONIUM BROMIDE 10 MG/ML
INJECTION, SOLUTION INTRAVENOUS PRN
Status: DISCONTINUED | OUTPATIENT
Start: 2020-11-15 | End: 2020-11-15 | Stop reason: SDUPTHER

## 2020-11-15 RX ORDER — SODIUM CHLORIDE 9 MG/ML
INJECTION, SOLUTION INTRAVENOUS CONTINUOUS PRN
Status: DISCONTINUED | OUTPATIENT
Start: 2020-11-15 | End: 2020-11-15 | Stop reason: SDUPTHER

## 2020-11-15 RX ORDER — GLYCOPYRROLATE 1 MG/5 ML
SYRINGE (ML) INTRAVENOUS PRN
Status: DISCONTINUED | OUTPATIENT
Start: 2020-11-15 | End: 2020-11-15 | Stop reason: SDUPTHER

## 2020-11-15 RX ORDER — MEPERIDINE HYDROCHLORIDE 25 MG/ML
12.5 INJECTION INTRAMUSCULAR; INTRAVENOUS; SUBCUTANEOUS
Status: DISCONTINUED | OUTPATIENT
Start: 2020-11-15 | End: 2020-11-15 | Stop reason: HOSPADM

## 2020-11-15 RX ORDER — MORPHINE SULFATE 2 MG/ML
1 INJECTION, SOLUTION INTRAMUSCULAR; INTRAVENOUS EVERY 5 MIN PRN
Status: DISCONTINUED | OUTPATIENT
Start: 2020-11-15 | End: 2020-11-15 | Stop reason: HOSPADM

## 2020-11-15 RX ORDER — OXYCODONE HYDROCHLORIDE AND ACETAMINOPHEN 5; 325 MG/1; MG/1
2 TABLET ORAL PRN
Status: DISCONTINUED | OUTPATIENT
Start: 2020-11-15 | End: 2020-11-15 | Stop reason: HOSPADM

## 2020-11-15 RX ORDER — LIDOCAINE HYDROCHLORIDE 20 MG/ML
INJECTION, SOLUTION INTRAVENOUS PRN
Status: DISCONTINUED | OUTPATIENT
Start: 2020-11-15 | End: 2020-11-15 | Stop reason: SDUPTHER

## 2020-11-15 RX ORDER — OXYCODONE HYDROCHLORIDE AND ACETAMINOPHEN 5; 325 MG/1; MG/1
1 TABLET ORAL PRN
Status: DISCONTINUED | OUTPATIENT
Start: 2020-11-15 | End: 2020-11-15 | Stop reason: HOSPADM

## 2020-11-15 RX ORDER — DEXAMETHASONE SODIUM PHOSPHATE 10 MG/ML
INJECTION, SOLUTION INTRAMUSCULAR; INTRAVENOUS PRN
Status: DISCONTINUED | OUTPATIENT
Start: 2020-11-15 | End: 2020-11-15 | Stop reason: SDUPTHER

## 2020-11-15 RX ORDER — ONDANSETRON 2 MG/ML
INJECTION INTRAMUSCULAR; INTRAVENOUS PRN
Status: DISCONTINUED | OUTPATIENT
Start: 2020-11-15 | End: 2020-11-15 | Stop reason: SDUPTHER

## 2020-11-15 RX ADMIN — PIPERACILLIN AND TAZOBACTAM 3.38 G: 3; .375 INJECTION, POWDER, LYOPHILIZED, FOR SOLUTION INTRAVENOUS at 04:11

## 2020-11-15 RX ADMIN — ROCURONIUM BROMIDE 40 MG: 10 INJECTION, SOLUTION INTRAVENOUS at 15:57

## 2020-11-15 RX ADMIN — ROCURONIUM BROMIDE 10 MG: 10 INJECTION, SOLUTION INTRAVENOUS at 16:21

## 2020-11-15 RX ADMIN — FENTANYL CITRATE 100 MCG: 50 INJECTION, SOLUTION INTRAMUSCULAR; INTRAVENOUS at 11:57

## 2020-11-15 RX ADMIN — HYDROMORPHONE HYDROCHLORIDE 0.5 MG: 1 INJECTION, SOLUTION INTRAMUSCULAR; INTRAVENOUS; SUBCUTANEOUS at 17:00

## 2020-11-15 RX ADMIN — MIDAZOLAM 2 MG: 1 INJECTION INTRAMUSCULAR; INTRAVENOUS at 15:56

## 2020-11-15 RX ADMIN — ACETAMINOPHEN 650 MG: 325 TABLET ORAL at 20:42

## 2020-11-15 RX ADMIN — SUGAMMADEX 200 MG: 100 INJECTION, SOLUTION INTRAVENOUS at 16:33

## 2020-11-15 RX ADMIN — PROPOFOL 150 MG: 10 INJECTION, EMULSION INTRAVENOUS at 15:57

## 2020-11-15 RX ADMIN — Medication 50 MG: at 08:20

## 2020-11-15 RX ADMIN — Medication 10 ML: at 08:20

## 2020-11-15 RX ADMIN — HYDROMORPHONE HYDROCHLORIDE 0.5 MG: 1 INJECTION, SOLUTION INTRAMUSCULAR; INTRAVENOUS; SUBCUTANEOUS at 17:05

## 2020-11-15 RX ADMIN — FENTANYL CITRATE 50 MCG: 50 INJECTION, SOLUTION INTRAMUSCULAR; INTRAVENOUS at 16:05

## 2020-11-15 RX ADMIN — HYDROMORPHONE HYDROCHLORIDE 0.5 MG: 1 INJECTION, SOLUTION INTRAMUSCULAR; INTRAVENOUS; SUBCUTANEOUS at 17:10

## 2020-11-15 RX ADMIN — BUPRENORPHINE HYDROCHLORIDE AND NALOXONE HYDROCHLORIDE DIHYDRATE 1 TABLET: 8; 2 TABLET SUBLINGUAL at 08:20

## 2020-11-15 RX ADMIN — HYDROMORPHONE HYDROCHLORIDE 0.5 MG: 1 INJECTION, SOLUTION INTRAMUSCULAR; INTRAVENOUS; SUBCUTANEOUS at 17:18

## 2020-11-15 RX ADMIN — LIDOCAINE HYDROCHLORIDE 100 MG: 20 INJECTION, SOLUTION INTRAVENOUS at 15:57

## 2020-11-15 RX ADMIN — DEXAMETHASONE SODIUM PHOSPHATE 10 MG: 10 INJECTION, SOLUTION INTRAMUSCULAR; INTRAVENOUS at 15:57

## 2020-11-15 RX ADMIN — HYDROMORPHONE HYDROCHLORIDE 0.5 MG: 1 INJECTION, SOLUTION INTRAMUSCULAR; INTRAVENOUS; SUBCUTANEOUS at 17:30

## 2020-11-15 RX ADMIN — PIPERACILLIN AND TAZOBACTAM 3.38 G: 3; .375 INJECTION, POWDER, LYOPHILIZED, FOR SOLUTION INTRAVENOUS at 11:47

## 2020-11-15 RX ADMIN — PIPERACILLIN AND TAZOBACTAM 3.38 G: 3; .375 INJECTION, POWDER, LYOPHILIZED, FOR SOLUTION INTRAVENOUS at 20:44

## 2020-11-15 RX ADMIN — CLINDAMYCIN PHOSPHATE 900 MG: 900 INJECTION, SOLUTION INTRAVENOUS at 08:17

## 2020-11-15 RX ADMIN — SODIUM CHLORIDE: 9 INJECTION, SOLUTION INTRAVENOUS at 15:56

## 2020-11-15 RX ADMIN — BUPRENORPHINE HYDROCHLORIDE AND NALOXONE HYDROCHLORIDE DIHYDRATE 1 TABLET: 8; 2 TABLET SUBLINGUAL at 20:42

## 2020-11-15 RX ADMIN — Medication 0.6 MG: at 16:32

## 2020-11-15 RX ADMIN — FENTANYL CITRATE 100 MCG: 50 INJECTION, SOLUTION INTRAMUSCULAR; INTRAVENOUS at 15:57

## 2020-11-15 RX ADMIN — HYOSCYAMINE SULFATE: 16 SOLUTION at 08:20

## 2020-11-15 RX ADMIN — OXYCODONE HYDROCHLORIDE 10 MG: 10 TABLET ORAL at 11:36

## 2020-11-15 RX ADMIN — ONDANSETRON HYDROCHLORIDE 4 MG: 2 INJECTION, SOLUTION INTRAMUSCULAR; INTRAVENOUS at 16:26

## 2020-11-15 RX ADMIN — Medication 3 MG: at 16:32

## 2020-11-15 RX ADMIN — FLUCONAZOLE IN SODIUM CHLORIDE 400 MG: 2 INJECTION, SOLUTION INTRAVENOUS at 15:26

## 2020-11-15 RX ADMIN — Medication 10 ML: at 20:43

## 2020-11-15 RX ADMIN — HYDROMORPHONE HYDROCHLORIDE 0.5 MG: 1 INJECTION, SOLUTION INTRAMUSCULAR; INTRAVENOUS; SUBCUTANEOUS at 16:54

## 2020-11-15 RX ADMIN — VANCOMYCIN HYDROCHLORIDE 1.5 G: 10 INJECTION, POWDER, LYOPHILIZED, FOR SOLUTION INTRAVENOUS at 04:57

## 2020-11-15 RX ADMIN — ENOXAPARIN SODIUM 40 MG: 40 INJECTION SUBCUTANEOUS at 08:19

## 2020-11-15 RX ADMIN — ACETAMINOPHEN 650 MG: 325 TABLET ORAL at 08:20

## 2020-11-15 RX ADMIN — SODIUM CHLORIDE: 9 INJECTION, SOLUTION INTRAVENOUS at 00:30

## 2020-11-15 RX ADMIN — CLINDAMYCIN PHOSPHATE 900 MG: 900 INJECTION, SOLUTION INTRAVENOUS at 00:23

## 2020-11-15 RX ADMIN — SODIUM CHLORIDE: 9 INJECTION, SOLUTION INTRAVENOUS at 08:22

## 2020-11-15 RX ADMIN — OXYCODONE HYDROCHLORIDE AND ACETAMINOPHEN 500 MG: 500 TABLET ORAL at 08:20

## 2020-11-15 ASSESSMENT — PULMONARY FUNCTION TESTS
PIF_VALUE: 26
PIF_VALUE: 22
PIF_VALUE: 43
PIF_VALUE: 22
PIF_VALUE: 24
PIF_VALUE: 16
PIF_VALUE: 20
PIF_VALUE: 2
PIF_VALUE: 23
PIF_VALUE: 24
PIF_VALUE: 15
PIF_VALUE: 22
PIF_VALUE: 0
PIF_VALUE: 21
PIF_VALUE: 0
PIF_VALUE: 25
PIF_VALUE: 7
PIF_VALUE: 22
PIF_VALUE: 9
PIF_VALUE: 1
PIF_VALUE: 17
PIF_VALUE: 20
PIF_VALUE: 25
PIF_VALUE: 21
PIF_VALUE: 22
PIF_VALUE: 0
PIF_VALUE: 23
PIF_VALUE: 15
PIF_VALUE: 0
PIF_VALUE: 22
PIF_VALUE: 23
PIF_VALUE: 0
PIF_VALUE: 21
PIF_VALUE: 0
PIF_VALUE: 0
PIF_VALUE: 22
PIF_VALUE: 21
PIF_VALUE: 23
PIF_VALUE: 14
PIF_VALUE: 24
PIF_VALUE: 22
PIF_VALUE: 23
PIF_VALUE: 1
PIF_VALUE: 0
PIF_VALUE: 0
PIF_VALUE: 23
PIF_VALUE: 21
PIF_VALUE: 1
PIF_VALUE: 1
PIF_VALUE: 16
PIF_VALUE: 17
PIF_VALUE: 23
PIF_VALUE: 17
PIF_VALUE: 16
PIF_VALUE: 3
PIF_VALUE: 0
PIF_VALUE: 9
PIF_VALUE: 24
PIF_VALUE: 0
PIF_VALUE: 15
PIF_VALUE: 15
PIF_VALUE: 0
PIF_VALUE: 9

## 2020-11-15 ASSESSMENT — PAIN DESCRIPTION - DESCRIPTORS
DESCRIPTORS: ACHING;DISCOMFORT
DESCRIPTORS: ACHING;SORE

## 2020-11-15 ASSESSMENT — PAIN SCALES - GENERAL
PAINLEVEL_OUTOF10: 8
PAINLEVEL_OUTOF10: 6
PAINLEVEL_OUTOF10: 3
PAINLEVEL_OUTOF10: 8
PAINLEVEL_OUTOF10: 8
PAINLEVEL_OUTOF10: 9
PAINLEVEL_OUTOF10: 8
PAINLEVEL_OUTOF10: 0
PAINLEVEL_OUTOF10: 0
PAINLEVEL_OUTOF10: 3
PAINLEVEL_OUTOF10: 8
PAINLEVEL_OUTOF10: 3
PAINLEVEL_OUTOF10: 8
PAINLEVEL_OUTOF10: 0

## 2020-11-15 ASSESSMENT — PAIN DESCRIPTION - PAIN TYPE
TYPE: SURGICAL PAIN
TYPE: ACUTE PAIN

## 2020-11-15 ASSESSMENT — PAIN DESCRIPTION - ORIENTATION
ORIENTATION: RIGHT

## 2020-11-15 ASSESSMENT — PAIN DESCRIPTION - LOCATION
LOCATION: GROIN
LOCATION: GROIN
LOCATION: PERINEUM
LOCATION: GROIN

## 2020-11-15 ASSESSMENT — LIFESTYLE VARIABLES: SMOKING_STATUS: 1

## 2020-11-15 ASSESSMENT — PAIN DESCRIPTION - ONSET: ONSET: ON-GOING

## 2020-11-15 ASSESSMENT — PAIN DESCRIPTION - PROGRESSION
CLINICAL_PROGRESSION: NOT CHANGED

## 2020-11-15 ASSESSMENT — PAIN DESCRIPTION - FREQUENCY: FREQUENCY: CONTINUOUS

## 2020-11-15 NOTE — ANESTHESIA PRE PROCEDURE
Department of Anesthesiology  Preprocedure Note       Name:  Patsy Durna   Age:  37 y.o.  :  1980                                          MRN:  [id number]         Date:  11/15/2020      Surgeon: Luís Reyes):  Abril De La Garza MD    Procedure: Procedure(s): I AND D RIGHT GROIN, THIGH STIRRUPS    Medications prior to admission:   Prior to Admission medications    Medication Sig Start Date End Date Taking? Authorizing Provider   buprenorphine-naloxone (SUBOXONE) 8-2 MG SUBL SL tablet Place 1 tablet under the tongue 2 times daily.    Yes Historical Provider, MD       Current medications:    Current Facility-Administered Medications   Medication Dose Route Frequency Provider Last Rate Last Dose    HYDROmorphone (DILAUDID) injection 0.5 mg  0.5 mg Intravenous Q4H PRN Ivett Pierre DO        fluconazole (DIFLUCAN) in 0.9 % sodium chloride IVPB 400 mg  400 mg Intravenous Q24H John Dc MD        HYDROmorphone (DILAUDID) injection 0.25 mg  0.25 mg Intravenous Q5 Min PRN Blanca Lester DO        HYDROmorphone (DILAUDID) injection 0.5 mg  0.5 mg Intravenous Q5 Min PRN Blanca Lester,         morphine (PF) injection 1 mg  1 mg Intravenous Q5 Min PRN Blanca Lester,         morphine (PF) injection 2 mg  2 mg Intravenous Q5 Min PRN Lucas Anderson DO        oxyCODONE-acetaminophen (PERCOCET) 5-325 MG per tablet 1 tablet  1 tablet Oral PRN Lucas Anderson DO        Or    oxyCODONE-acetaminophen (PERCOCET) 5-325 MG per tablet 2 tablet  2 tablet Oral PRN Lucas Anderson DO        ondansetron [facility]) injection 4 mg  4 mg Intravenous Once PRN Lucas Anderson DO        meperidine (DEMEROL) injection 12.5 mg  12.5 mg Intravenous Q15 Min PRN Blanca Lester DO        zinc sulfate (ZINCATE) capsule 50 mg  50 mg Oral Daily Bernett Mortimer, MD   50 mg at 11/15/20 0820    vitamin C (ASCORBIC ACID) tablet 500 mg  500 mg Oral Daily Bernett Mortimer, MD   500 mg at 11/15/20 POCCL, POCBUN, POCHEMO, POCHCT in the last 72 hours. Coags:   Lab Results   Component Value Date    PROTIME 17.2 11/12/2020    INR 1.5 11/12/2020    APTT 26.4 11/12/2020       HCG (If Applicable): No results found for: PREGTESTUR, PREGSERUM, HCG, HCGQUANT     ABGs: No results found for: PHART, PO2ART, YVI8HFN, UFH9KKG, BEART, [id number]     Type & Screen (If Applicable):  No results found for: LABABO, LABRH    Drug/Infectious Status (If Applicable):  No results found for: HIV, HEPCAB    COVID-19 Screening (If Applicable): No results found for: COVID19      Anesthesia Evaluation  Patient summary reviewed and Nursing notes reviewed no history of anesthetic complications:   Airway: Mallampati: II  TM distance: >3 FB   Neck ROM: full  Mouth opening: > = 3 FB Dental:      Comment: Patient denies chipped, loose, missing, and removable teeth. Pulmonary:Negative Pulmonary ROS breath sounds clear to auscultation  (+) decreased breath sounds,  current smoker          Patient did not smoke on day of surgery. Cardiovascular:Negative CV ROS            Rhythm: regular  Rate: normal           Beta Blocker:  Not on Beta Blocker         Neuro/Psych:   Negative Neuro/Psych ROS              GI/Hepatic/Renal:   (+) PUD (Ulcerative Colitis),           Endo/Other: Negative Endo/Other ROS             Pt had no PAT visit       Abdominal:           Vascular: negative vascular ROS. Anesthesia Plan      general     ASA 3     (PIV#18 Left FA  PIV#20 Right FA)  Induction: intravenous. MIPS: Postoperative opioids intended, Prophylactic antiemetics administered and Postoperative trial extubation. Anesthetic plan and risks discussed with patient. Use of blood products discussed with patient whom consented to blood products. Plan discussed with CRNA and attending.                   Stefany Larios RN   11/15/2020

## 2020-11-15 NOTE — CONSULTS
Froylan, DO   10 mg at 11/15/20 1136    acetaminophen (TYLENOL) tablet 650 mg  650 mg Oral Q6H Jaelyn Froylan, DO   650 mg at 11/15/20 0820    buprenorphine-naloxone (SUBOXONE) 8-2 MG SL tablet 1 tablet  1 tablet Sublingual BID Ivett ABRAM Pierre, DO   1 tablet at 11/15/20 0820    piperacillin-tazobactam (ZOSYN) 3.375 g in dextrose 5 % 100 mL IVPB extended infusion (mini-bag)  3.375 g Intravenous Q8H Hurley Beat, DO 25 mL/hr at 11/15/20 1147 3.375 g at 11/15/20 1147    Post Zosyn Flush (0.9 % sodium chloride infusion)   Intravenous Q8H Hurley Beat, DO   Stopped at 11/15/20 1022       Allergies:  Patient has no known allergies. Social History:    Smokes Cig  1 PPD   Denies IV drug use, hx of cocaine use     Family History:     Not pertinent to present illness       REVIEW OF SYSTEMS:    CONSTITUTIONAL:  fever  HEENT: denies blurring of vision or double vision, denies hearing problem  RESPIRATORY: denies cough, shortness of breath, sputum expectoration, chest pain. CARDIOVASCULAR:  Denies palpitation  GASTROINTESTINAL:  Denies abdomen pain, diarrhea or constipation. GENITOURINARY:  Denies burning urination or frequency of urination  INTEGUMENT: Right groin wound   HEMATOLOGIC/LYMPHATIC:  Denies lymph node swelling, gum bleeding or easy bruising. MUSCULOSKELETAL:  Pain in the right groin, scrotum   NEUROLOGICAL:  Weakness     PHYSICAL EXAM:      Vitals:     BP (!) 96/55   Pulse 71   Temp 98 °F (36.7 °C) (Infrared)   Resp 14   Ht 5' 10\" (1.778 m)   Wt 180 lb (81.6 kg)   SpO2 93%   BMI 25.83 kg/m²     General Appearance:    Awake, alert , no acute distress. Head:    Normocephalic, atraumatic   Eyes:    No pallor, no icterus,   Ears:    No obvious deformity or drainage.    Nose:   No nasal drainage   Throat:   Mucosa moist, no oral thrush   Neck:   Supple, no lymphadenopathy   Back:     no CVA tenderness   Lungs:     Clear to auscultation bilaterally, no wheeze    Heart:    Regular rate and rhythm, no murmur, rub or gallop   Abdomen:     Soft, non-tender, bowel sounds present    Extremities:   No edema, no cyanosis   Right tight tender, erythematous, warm to touch    Pulses:   Dorsalis pedis palpable    Skin:   Right thigh wound - dressed         Lab Results   Component Value Date    WBC 25.1 11/15/2020    RBC 3.56 11/15/2020    HGB 10.0 11/15/2020    HCT 30.8 11/15/2020     11/15/2020    MCV 86.5 11/15/2020    MCH 28.1 11/15/2020    MCHC 32.5 11/15/2020    RDW 16.0 11/15/2020    NRBC 0.9 11/12/2020    BLASTSPCT 1.7 11/15/2020    METASPCT 0.9 11/13/2020    LYMPHOPCT 13.9 11/15/2020    MONOPCT 1.7 11/15/2020    MYELOPCT 0.9 11/14/2020    BASOPCT 0.3 11/15/2020    MONOSABS 0.50 11/15/2020    LYMPHSABS 3.51 11/15/2020    EOSABS 0.23 11/15/2020    BASOSABS 0.00 11/15/2020       CMP     Lab Results   Component Value Date     11/15/2020    K 3.9 11/15/2020    K 3.4 11/12/2020     11/15/2020    CO2 28 11/15/2020    BUN 13 11/15/2020    CREATININE 0.8 11/15/2020    GFRAA >60 11/15/2020    LABGLOM >60 11/15/2020    GLUCOSE 103 11/15/2020    PROT 5.5 11/15/2020    LABALBU 2.3 11/15/2020    CALCIUM 8.2 11/15/2020    BILITOT 1.2 11/15/2020    ALKPHOS 154 11/15/2020    AST 30 11/15/2020    ALT 19 11/15/2020         Hepatic Function Panel:    Lab Results   Component Value Date    ALKPHOS 154 11/15/2020    ALT 19 11/15/2020    AST 30 11/15/2020    PROT 5.5 11/15/2020    BILITOT 1.2 11/15/2020    LABALBU 2.3 11/15/2020       PT/INR:    Lab Results   Component Value Date    PROTIME 17.2 11/12/2020    INR 1.5 11/12/2020       TSH:  No results found for: TSH    U/A:  No results found for: NITRITE, COLORU, PHUR, LABCAST, WBCUA, RBCUA, MUCUS, TRICHOMONAS, YEAST, BACTERIA, CLARITYU, SPECGRAV, LEUKOCYTESUR, UROBILINOGEN, BILIRUBINUR, BLOODU, GLUCOSEU, AMORPHOUS    ABG:  No results found for: HDB2DKW, BEART, K2ZLDXIH, PHART, THGBART, BGG0XWX, PO2ART, VHU0EMI    MICROBIOLOGY:    Wound Culture - E coli, Candida       IMPRESSION:     1. Perianal abscess , necrotizing soft tissue infection,  sepsis s/p debridement   2. Leukocytosis, leukemoid reaction     RECOMMENDATIONS:     1.Zosyn 3.375 grams IV q 8 hrs   2. Diflucan 400 mg IV q 24 hrs   3. To OR for exploration     Discussed with Dr Thomas Meyer

## 2020-11-15 NOTE — BRIEF OP NOTE
Brief Postoperative Note      Patient: Antonella Mccormick  YOB: 1980  MRN: [id number]    Date of Procedure: 11/15/2020    Pre-Op Diagnosis: Necotizing soft tissue infection right scrotum, right groin, and right medial thigh, perineum. Post-Op Diagnosis: Same       Procedure(s): I AND D RIGHT GROIN, THIGH STIRRUPS--Excisional debridement right groin, scrotum, perineum    Surgeon(s):  Noe Vazquez MD    Assistant:  Resident: Kashif Riley MD    Anesthesia: General    Estimated Blood Loss (mL): 10 mL    Complications: None    Specimens:   ID Type Source Tests Collected by Time Destination   A : RIGHT GROIN TISSUE Tissue Tissue SURGICAL PATHOLOGY Noe Vazquez MD 11/15/2020 1618        Implants:  * No implants in log *      Drains:   Open Drain Midline Perineal 0.5 [place] (Active)   Site Description Unable to view 11/15/20 1200   Dressing Status Intact 11/15/20 1200   Drainage Appearance Serosanguinous 11/14/20 0600   Status Other (Comment) 11/13/20 2000       Urethral Catheter (Active)   Catheter Indications Need for fluid management in critically ill patients in a critical care setting not able to be managed by other means such as BSC with hat, bedpan, urinal, condom catheter, or short term intermittent urethral catherization 11/15/20 1200   Securement Device Date Changed 11/13/20 11/13/20 2000   Site Assessment No urethral drainage 11/15/20 1200   Urine Color Michelle 11/15/20 1200   Urine Appearance Clear 11/15/20 1200   Output (mL) 100 mL 11/15/20 1300       Findings: Abscess formation right groin, anterior thigh. Abscess formation of right hemiscrotum.      Electronically signed by Kashif Riley MD on 11/15/2020 at 4:35 PM

## 2020-11-15 NOTE — PROGRESS NOTES
Pharmacy Consultation Note  (Antibiotic Dosing and Monitoring)    Initial consult date: 2020  Consulting physician: Dr. Bacilio Norwood  Drug(s): vancomycin  Indication: Necrotizing fasciitis (perineal/scrotal)  Age/Gender IBW DW  Allergy Information   39 y.o./M; 177.8 cm  81.6 kg  Patient has no known allergies. Date  WBC BUN/CR Drug/Dose Time   Given Level(s)   (Time) Comments    38.7 18/0.9 Unconfirmed (1000 mg x1?) (?)      42.7 15/0.7 vancomycin 1500 mg q12h 0456, 1648      38.7 17/0.8 vanco 1500 mg q12h 0530, 1733 Trough = 13.8 mCg/mL @ 1600    11/15 25.1  vanco 1500 mg q12h;  stopped @ 7566 0457,                 Estimated Creatinine Clearance: 125 mL/min (based on SCr of 0.8 mg/dL). Intake/Output Summary (Last 24 hours) at 11/15/2020 0902  Last data filed at 11/15/2020 0700  Gross per 24 hour   Intake 2792 ml   Output 1995 ml   Net 797 ml   UO = 1 mL/kg/hr (1995 mL)    Temp max: Temp (24hrs), Av.7 °F (36.5 °C), Min:97.3 °F (36.3 °C), Max:98.1 °F (36.7 °C)      Cultures:  available culture and sensitivity results were reviewed in EPIC    Nares: negative for MRSA ( @ 1034)    Groin: GNR ( @ 1116)    Blood (x2): NGTD ( @ 0335)       Assessment:  · Consulted by Dr. Danish Rainey to dose/monitor vancomycin. · Goal trough level:  15-20 mCg/mL. 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 · S/P I&D and penrose drain placement early  AM.    · Empiric ATBs (clinda, ceftriaxone, and vanco 1000 mg x1 on  @ 1900) started and [address] [place] [address]. Cannot confirm that ATBs were given at [address] [place] [address].  · Vanco, clinda, and pip-tazo continued after transfer to [facility]. · : day #2 vanco, clinda, pip-tazo. Cx's as above. Good UO documented, WBC decreasing, afebrile. Dressing changed today. · 11/15: day #3 ATBs. GNR in Groin Cx. Vanc Tr 13.8 on .     Plan:  · Continue vancomycin 1500 mg

## 2020-11-16 LAB
ALBUMIN SERPL-MCNC: 2.2 G/DL (ref 3.5–5.2)
ALP BLD-CCNC: 133 U/L (ref 40–129)
ALT SERPL-CCNC: 35 U/L (ref 0–40)
ANION GAP SERPL CALCULATED.3IONS-SCNC: 10 MMOL/L (ref 7–16)
ANISOCYTOSIS: ABNORMAL
AST SERPL-CCNC: 49 U/L (ref 0–39)
BASOPHILS ABSOLUTE: 0 E9/L (ref 0–0.2)
BASOPHILS RELATIVE PERCENT: 0.4 % (ref 0–2)
BILIRUB SERPL-MCNC: 1 MG/DL (ref 0–1.2)
BUN BLDV-MCNC: 14 MG/DL (ref 6–20)
CALCIUM SERPL-MCNC: 8.3 MG/DL (ref 8.6–10.2)
CHLORIDE BLD-SCNC: 103 MMOL/L (ref 98–107)
CO2: 29 MMOL/L (ref 22–29)
CREAT SERPL-MCNC: 0.8 MG/DL (ref 0.7–1.2)
EOSINOPHILS ABSOLUTE: 0 E9/L (ref 0.05–0.5)
EOSINOPHILS RELATIVE PERCENT: 0 % (ref 0–6)
GFR AFRICAN AMERICAN: >60
GFR NON-AFRICAN AMERICAN: >60 ML/MIN/1.73
GLUCOSE BLD-MCNC: 135 MG/DL (ref 74–99)
HCT VFR BLD CALC: 32.8 % (ref 37–54)
HEMOGLOBIN: 10.6 G/DL (ref 12.5–16.5)
HYPOCHROMIA: ABNORMAL
LYMPHOCYTES ABSOLUTE: 1.1 E9/L (ref 1.5–4)
LYMPHOCYTES RELATIVE PERCENT: 4.3 % (ref 20–42)
MCH RBC QN AUTO: 27.7 PG (ref 26–35)
MCHC RBC AUTO-ENTMCNC: 32.3 % (ref 32–34.5)
MCV RBC AUTO: 85.9 FL (ref 80–99.9)
MONOCYTES ABSOLUTE: 1.1 E9/L (ref 0.1–0.95)
MONOCYTES RELATIVE PERCENT: 4.3 % (ref 2–12)
MYELOCYTE PERCENT: 1.7 % (ref 0–0)
NEUTROPHILS ABSOLUTE: 25.03 E9/L (ref 1.8–7.3)
NEUTROPHILS RELATIVE PERCENT: 89.6 % (ref 43–80)
OVALOCYTES: ABNORMAL
PDW BLD-RTO: 16.2 FL (ref 11.5–15)
PLATELET # BLD: 620 E9/L (ref 130–450)
PMV BLD AUTO: 10 FL (ref 7–12)
POIKILOCYTES: ABNORMAL
POLYCHROMASIA: ABNORMAL
POTASSIUM SERPL-SCNC: 4.8 MMOL/L (ref 3.5–5)
RBC # BLD: 3.82 E12/L (ref 3.8–5.8)
SODIUM BLD-SCNC: 142 MMOL/L (ref 132–146)
TARGET CELLS: ABNORMAL
TOTAL PROTEIN: 5.6 G/DL (ref 6.4–8.3)
WBC # BLD: 27.5 E9/L (ref 4.5–11.5)

## 2020-11-16 PROCEDURE — 6360000002 HC RX W HCPCS: Performed by: STUDENT IN AN ORGANIZED HEALTH CARE EDUCATION/TRAINING PROGRAM

## 2020-11-16 PROCEDURE — 6360000002 HC RX W HCPCS: Performed by: SURGERY

## 2020-11-16 PROCEDURE — 36415 COLL VENOUS BLD VENIPUNCTURE: CPT

## 2020-11-16 PROCEDURE — 2700000000 HC OXYGEN THERAPY PER DAY

## 2020-11-16 PROCEDURE — 85025 COMPLETE CBC W/AUTO DIFF WBC: CPT

## 2020-11-16 PROCEDURE — 99024 POSTOP FOLLOW-UP VISIT: CPT | Performed by: SURGERY

## 2020-11-16 PROCEDURE — 2580000003 HC RX 258: Performed by: SURGERY

## 2020-11-16 PROCEDURE — 6370000000 HC RX 637 (ALT 250 FOR IP): Performed by: SURGERY

## 2020-11-16 PROCEDURE — 80053 COMPREHEN METABOLIC PANEL: CPT

## 2020-11-16 PROCEDURE — 1200000000 HC SEMI PRIVATE

## 2020-11-16 RX ADMIN — PIPERACILLIN AND TAZOBACTAM 3.38 G: 3; .375 INJECTION, POWDER, LYOPHILIZED, FOR SOLUTION INTRAVENOUS at 20:28

## 2020-11-16 RX ADMIN — SODIUM CHLORIDE, PRESERVATIVE FREE 10 ML: 5 INJECTION INTRAVENOUS at 14:21

## 2020-11-16 RX ADMIN — ACETAMINOPHEN 650 MG: 325 TABLET ORAL at 10:09

## 2020-11-16 RX ADMIN — OXYCODONE HYDROCHLORIDE AND ACETAMINOPHEN 500 MG: 500 TABLET ORAL at 10:10

## 2020-11-16 RX ADMIN — PIPERACILLIN AND TAZOBACTAM 3.38 G: 3; .375 INJECTION, POWDER, LYOPHILIZED, FOR SOLUTION INTRAVENOUS at 14:21

## 2020-11-16 RX ADMIN — BUPRENORPHINE HYDROCHLORIDE AND NALOXONE HYDROCHLORIDE DIHYDRATE 1 TABLET: 8; 2 TABLET SUBLINGUAL at 20:31

## 2020-11-16 RX ADMIN — PIPERACILLIN AND TAZOBACTAM 3.38 G: 3; .375 INJECTION, POWDER, LYOPHILIZED, FOR SOLUTION INTRAVENOUS at 04:00

## 2020-11-16 RX ADMIN — SODIUM CHLORIDE: 9 INJECTION, SOLUTION INTRAVENOUS at 08:08

## 2020-11-16 RX ADMIN — HYOSCYAMINE SULFATE: 16 SOLUTION at 08:09

## 2020-11-16 RX ADMIN — ACETAMINOPHEN 650 MG: 325 TABLET ORAL at 20:31

## 2020-11-16 RX ADMIN — Medication 50 MG: at 10:10

## 2020-11-16 RX ADMIN — SODIUM CHLORIDE: 9 INJECTION, SOLUTION INTRAVENOUS at 00:41

## 2020-11-16 RX ADMIN — Medication 10 ML: at 10:09

## 2020-11-16 RX ADMIN — SODIUM CHLORIDE, PRESERVATIVE FREE 10 ML: 5 INJECTION INTRAVENOUS at 14:20

## 2020-11-16 RX ADMIN — ACETAMINOPHEN 650 MG: 325 TABLET ORAL at 02:34

## 2020-11-16 RX ADMIN — BUPRENORPHINE HYDROCHLORIDE AND NALOXONE HYDROCHLORIDE DIHYDRATE 1 TABLET: 8; 2 TABLET SUBLINGUAL at 10:08

## 2020-11-16 RX ADMIN — OXYCODONE HYDROCHLORIDE 10 MG: 10 TABLET ORAL at 06:08

## 2020-11-16 RX ADMIN — Medication 10 ML: at 20:32

## 2020-11-16 RX ADMIN — ENOXAPARIN SODIUM 40 MG: 40 INJECTION SUBCUTANEOUS at 10:08

## 2020-11-16 RX ADMIN — FLUCONAZOLE IN SODIUM CHLORIDE 400 MG: 2 INJECTION, SOLUTION INTRAVENOUS at 14:20

## 2020-11-16 ASSESSMENT — PAIN SCALES - GENERAL
PAINLEVEL_OUTOF10: 2
PAINLEVEL_OUTOF10: 8
PAINLEVEL_OUTOF10: 9
PAINLEVEL_OUTOF10: 2
PAINLEVEL_OUTOF10: 0

## 2020-11-16 ASSESSMENT — PAIN - FUNCTIONAL ASSESSMENT
PAIN_FUNCTIONAL_ASSESSMENT: ACTIVITIES ARE NOT PREVENTED
PAIN_FUNCTIONAL_ASSESSMENT: ACTIVITIES ARE NOT PREVENTED

## 2020-11-16 ASSESSMENT — PAIN DESCRIPTION - PAIN TYPE: TYPE: ACUTE PAIN;SURGICAL PAIN

## 2020-11-16 ASSESSMENT — PAIN DESCRIPTION - DESCRIPTORS: DESCRIPTORS: ACHING;DISCOMFORT;SORE

## 2020-11-16 ASSESSMENT — PAIN DESCRIPTION - FREQUENCY: FREQUENCY: CONTINUOUS

## 2020-11-16 ASSESSMENT — PAIN DESCRIPTION - LOCATION: LOCATION: PERINEUM;SACRUM

## 2020-11-16 NOTE — CARE COORDINATION
11/16/2020 social work transition of care planning  Sw followed up with pt to discuss transition of care planning. Sw explained to pt that he will need a physician to follow for [facility]. Sw discussed infusion center,if appropriate. Final atb pending. Referral made to [facility]-will await return call. Plan is home. Electronically signed by SEGUNDO Salazar on 11/16/2020 at 1:08 PM     Addendum: [facility] can not accept, referral made to [facility].   Electronically signed by SEGUNDO Salazar on 11/16/2020 at 2:22 PM

## 2020-11-16 NOTE — PROGRESS NOTES
Department of Internal Medicine  Infectious Diseases  Progress Note    C/C :  Perineal abscess, sepsis     Pt is awake and alert  Denies fever or chills   Pain -less today   Afebrile       Current Facility-Administered Medications   Medication Dose Route Frequency Provider Last Rate Last Dose    HYDROmorphone (DILAUDID) injection 0.5 mg  0.5 mg Intravenous Q4H PRN Ivett E Kaercher, DO        fluconazole (DIFLUCAN) in 0.9 % sodium chloride IVPB 400 mg  400 mg Intravenous Q24H Michael Engel  mL/hr at 11/15/20 1526 400 mg at 11/15/20 1526    zinc sulfate (ZINCATE) capsule 50 mg  50 mg Oral Daily Ivett E Kaercher, DO   50 mg at 11/16/20 1010    vitamin C (ASCORBIC ACID) tablet 500 mg  500 mg Oral Daily Ivett E Kaercher, DO   500 mg at 11/16/20 1010    sodium hypochlorite (DAKINS) external solution   Irrigation Daily Ivett E Kaercher, DO        sodium chloride flush 0.9 % injection 10 mL  10 mL Intravenous 2 times per day Thierno العراقي, DO   10 mL at 11/16/20 1009    sodium chloride flush 0.9 % injection 10 mL  10 mL Intravenous PRN Ivett E Kaercher, DO        ondansetron (ZOFRAN) injection 4 mg  4 mg Intravenous Q6H PRN Ivett E Kaercher, DO        enoxaparin (LOVENOX) injection 40 mg  40 mg Subcutaneous Daily Ivett E Kaercher, DO   40 mg at 11/16/20 1008    oxyCODONE (ROXICODONE) immediate release tablet 5 mg  5 mg Oral Q4H PRN Ivett E Kaercher, DO        Or    oxyCODONE HCl (OXY-IR) immediate release tablet 10 mg  10 mg Oral Q4H PRN Ivett E Kaercher, DO   10 mg at 11/16/20 0608    acetaminophen (TYLENOL) tablet 650 mg  650 mg Oral Q6H Ivett E Kaercher, DO   650 mg at 11/16/20 1009    buprenorphine-naloxone (SUBOXONE) 8-2 MG SL tablet 1 tablet  1 tablet Sublingual BID Ivett E Kaercher, DO   1 tablet at 11/16/20 1008    piperacillin-tazobactam (ZOSYN) 3.375 g in dextrose 5 % 100 mL IVPB extended infusion (mini-bag)  3.375 g Intravenous Q8H Ivett Pierre DO   Stopped at 11/16/20 0808    Post Zosyn Flush (0.9 % sodium chloride infusion)   Intravenous Q8H Ivett Pierre DO   Stopped at 11/16/20 1009       REVIEW OF SYSTEMS:    CONSTITUTIONAL:  Denies fever  HEENT: denies blurring of vision or double vision, denies hearing problem  RESPIRATORY: denies cough, shortness of breath, sputum expectoration, chest pain. CARDIOVASCULAR:  Denies palpitation  GASTROINTESTINAL:  Denies abdomen pain, diarrhea or constipation. GENITOURINARY:  Denies burning urination or frequency of urination  INTEGUMENT: Right groin wound   HEMATOLOGIC/LYMPHATIC:  Denies lymph node swelling, gum bleeding  MUSCULOSKELETAL:  Pain in the right groin, scrotum   NEUROLOGICAL:  Weakness     PHYSICAL EXAM:      Vitals:     BP (!) 99/55   Pulse 52   Temp 97.7 °F (36.5 °C) (Temporal)   Resp 16   Ht 5' 10\" (1.778 m)   Wt 180 lb (81.6 kg)   SpO2 95%   BMI 25.83 kg/m²     General Appearance:    Awake, alert , no acute distress. Head:    Normocephalic, atraumatic   Eyes:    No pallor, no icterus,   Ears:    No obvious deformity or drainage.    Nose:   No nasal drainage   Throat:   Mucosa moist, no oral thrush   Neck:   Supple, no lymphadenopathy   Back:     no CVA tenderness   Lungs:     Clear to auscultation bilaterally, no wheeze    Heart:    Regular rate and rhythm, no murmur, rub or gallop   Abdomen:     Soft, non-tender, bowel sounds present    Extremities:   No edema, no cyanosis   Right tight tender, erythematous, warm to touch    Pulses:   Dorsalis pedis palpable    Skin:   Right thigh wound - dressed         Lab Results   Component Value Date    WBC 27.5 11/16/2020    RBC 3.82 11/16/2020    HGB 10.6 11/16/2020    HCT 32.8 11/16/2020     11/16/2020    MCV 85.9 11/16/2020    MCH 27.7 11/16/2020    MCHC 32.3 11/16/2020    RDW 16.2 11/16/2020    NRBC 0.9 11/12/2020    BLASTSPCT 1.7 11/15/2020    METASPCT 0.9 11/13/2020    LYMPHOPCT 4.3 11/16/2020    MONOPCT 4.3 11/16/2020    MYELOPCT 1.7 11/16/2020 BASOPCT 0.4 11/16/2020    MONOSABS 1.10 11/16/2020    LYMPHSABS 1.10 11/16/2020    EOSABS 0.00 11/16/2020    BASOSABS 0.00 11/16/2020       CMP     Lab Results   Component Value Date     11/16/2020    K 4.8 11/16/2020    K 3.4 11/12/2020     11/16/2020    CO2 29 11/16/2020    BUN 14 11/16/2020    CREATININE 0.8 11/16/2020    GFRAA >60 11/16/2020    LABGLOM >60 11/16/2020    GLUCOSE 135 11/16/2020    PROT 5.6 11/16/2020    LABALBU 2.2 11/16/2020    CALCIUM 8.3 11/16/2020    BILITOT 1.0 11/16/2020    ALKPHOS 133 11/16/2020    AST 49 11/16/2020    ALT 35 11/16/2020         Hepatic Function Panel:    Lab Results   Component Value Date    ALKPHOS 133 11/16/2020    ALT 35 11/16/2020    AST 49 11/16/2020    PROT 5.6 11/16/2020    BILITOT 1.0 11/16/2020    LABALBU 2.2 11/16/2020       PT/INR:    Lab Results   Component Value Date    PROTIME 17.2 11/12/2020    INR 1.5 11/12/2020       TSH:  No results found for: TSH    U/A:  No results found for: NITRITE, COLORU, PHUR, LABCAST, WBCUA, RBCUA, MUCUS, TRICHOMONAS, YEAST, BACTERIA, CLARITYU, SPECGRAV, LEUKOCYTESUR, UROBILINOGEN, BILIRUBINUR, BLOODU, GLUCOSEU, AMORPHOUS    ABG:  No results found for: WOP3CRY, BEART, X0CJLNYM, PHART, THGBART, LDM5KUV, PO2ART, SSC1LVP    MICROBIOLOGY:    Wound Culture -  Abnormal)   Collected: 11/13/20 0136    Order Status: Completed  Specimen: Specimen from Groin  Updated: 11/15/20 0925     Organism  Escherichia coliAbnormal       Culture Surgical  Heavy growth     Organism  Candida albicansAbnormal       Culture Surgical  Light growth    Narrative:              IMPRESSION:     1. Perianal abscess , necrotizing soft tissue infection,  sepsis s/p debridement   2. Leukocytosis, leukemoid reaction - WBC 27 K     RECOMMENDATIONS:     1.Zosyn 3.375 grams IV q 8 hrs   2. Diflucan 400 mg IV to PO  q 24 hrs   3.  Wound care /CBC with diff

## 2020-11-16 NOTE — OP NOTE
[address]                  [address] [place],  [facility]                                OPERATIVE REPORT    PATIENT NAME: Wade Mejias                       :        1980  MED REC NO:   [id number]                            ROOM:       4685  ACCOUNT NO:   [de-identified]                           ADMIT DATE: 2020  PROVIDER:     Fermín Calero MD    DATE OF PROCEDURE:  11/15/2020    SURGEON:  Kimberly Cisneros MD    ASSISTANT:  Fermín Calero MD, PGY-5    PREOPERATIVE DIAGNOSIS:  Necrotizing soft tissue infection of right  groin, thigh, and right hemiscrotum. POSTOPERATIVE DIAGNOSIS:  Necrotizing soft tissue infection of right  groin, thigh, and right hemiscrotum. OPERATION:  Excisional debridement of right groin, medial right thigh,  and right hemiscrotum. Total area of 8 cm x 5 cm x 2 cm. ANESTHESIA:  General.    ESTIMATED BLOOD LOSS:  10 mL. COMPLICATIONS:  None. SPECIMENS:  Skin and subcutaneous tissue. HISTORY:  This is a 68-year-old male that had a necrotizing soft tissue  infection of the right groin, perineum, and scrotum; had undergone  incision and drainage followed by excisional debridement. Still had  persistent purulent drainage from wound. It was recommended he undergo  incision and drainage, possible excisional debridement. Risks,  benefits, and alternatives of the procedure were discussed with the  patient who stated understanding and agreed to proceed. DESCRIPTION OF THE PROCEDURE:  The patient was brought to the operating  room and positioned supine on the OR table. Sequential compression  devices were placed on the patient's lower extremities and functioning. Preoperative antibiotics were administered. General endotracheal  anesthesia was obtained without any complications as per the record. A  timeout was called. Surgical checklist was reviewed and agreed upon by  all members present.   He was then placed in

## 2020-11-16 NOTE — PROGRESS NOTES
General Surgery Progress Note:    CC: NSTI    S: doing ok, pain improved, no fevers       Objective:  @BP (!) 99/55   Pulse 52   Temp 97.7 °F (36.5 °C) (Temporal)   Resp 16   Ht 5' 10\" (1.778 m)   Wt 180 lb (81.6 kg)   SpO2 95%   BMI 25.83 kg/m² @    Physical -     Gen: NAD  Resp: Breathing Comfortably, no resp distress  CV: Reg Rate  Abd: SNT  EXT R thigh medial groin, wounds with fairly clean margins, there is moderate erythema of the medial aspect over the femoral triangle. 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 - ABX per ID,   - local wound care, lozano for now,   - monitor erythema, as may need to return for further exploration if worsening or does not improve. VTE Prophylaxis: scdsmanasa.        Mar Lopez MD FACS     10:41 AM

## 2020-11-17 ENCOUNTER — ANESTHESIA (OUTPATIENT)
Dept: OPERATING ROOM | Age: 40
DRG: 853 | End: 2020-11-17
Payer: COMMERCIAL

## 2020-11-17 ENCOUNTER — ANESTHESIA EVENT (OUTPATIENT)
Dept: OPERATING ROOM | Age: 40
DRG: 853 | End: 2020-11-17
Payer: COMMERCIAL

## 2020-11-17 VITALS
RESPIRATION RATE: 20 BRPM | OXYGEN SATURATION: 100 % | TEMPERATURE: 97.9 F | SYSTOLIC BLOOD PRESSURE: 140 MMHG | DIASTOLIC BLOOD PRESSURE: 88 MMHG

## 2020-11-17 LAB
ALBUMIN SERPL-MCNC: 2.1 G/DL (ref 3.5–5.2)
ALP BLD-CCNC: 115 U/L (ref 40–129)
ALT SERPL-CCNC: 40 U/L (ref 0–40)
ANAEROBIC CULTURE: ABNORMAL
ANION GAP SERPL CALCULATED.3IONS-SCNC: 5 MMOL/L (ref 7–16)
AST SERPL-CCNC: 44 U/L (ref 0–39)
BASOPHILS ABSOLUTE: 0 E9/L (ref 0–0.2)
BASOPHILS RELATIVE PERCENT: 0.7 % (ref 0–2)
BILIRUB SERPL-MCNC: 0.7 MG/DL (ref 0–1.2)
BLASTS RELATIVE PERCENT: 0.9 % (ref 0–0)
BUN BLDV-MCNC: 16 MG/DL (ref 6–20)
CALCIUM SERPL-MCNC: 7.9 MG/DL (ref 8.6–10.2)
CHLORIDE BLD-SCNC: 101 MMOL/L (ref 98–107)
CO2: 32 MMOL/L (ref 22–29)
CREAT SERPL-MCNC: 0.8 MG/DL (ref 0.7–1.2)
EOSINOPHILS ABSOLUTE: 0.34 E9/L (ref 0.05–0.5)
EOSINOPHILS RELATIVE PERCENT: 1.7 % (ref 0–6)
GFR AFRICAN AMERICAN: >60
GFR NON-AFRICAN AMERICAN: >60 ML/MIN/1.73
GLUCOSE BLD-MCNC: 110 MG/DL (ref 74–99)
HCT VFR BLD CALC: 31 % (ref 37–54)
HEMOGLOBIN: 10.1 G/DL (ref 12.5–16.5)
HYPOCHROMIA: ABNORMAL
INR BLD: 1.2
LYMPHOCYTES ABSOLUTE: 3.02 E9/L (ref 1.5–4)
LYMPHOCYTES RELATIVE PERCENT: 14.8 % (ref 20–42)
MCH RBC QN AUTO: 28.6 PG (ref 26–35)
MCHC RBC AUTO-ENTMCNC: 32.6 % (ref 32–34.5)
MCV RBC AUTO: 87.8 FL (ref 80–99.9)
METAMYELOCYTES RELATIVE PERCENT: 0.9 % (ref 0–1)
MONOCYTES ABSOLUTE: 0.8 E9/L (ref 0.1–0.95)
MONOCYTES RELATIVE PERCENT: 4.3 % (ref 2–12)
MYELOCYTE PERCENT: 3.5 % (ref 0–0)
NEUTROPHILS ABSOLUTE: 15.68 E9/L (ref 1.8–7.3)
NEUTROPHILS RELATIVE PERCENT: 73.9 % (ref 43–80)
ORGANISM: ABNORMAL
OVALOCYTES: ABNORMAL
PDW BLD-RTO: 16.6 FL (ref 11.5–15)
PLATELET # BLD: 620 E9/L (ref 130–450)
PMV BLD AUTO: 9.9 FL (ref 7–12)
POIKILOCYTES: ABNORMAL
POLYCHROMASIA: ABNORMAL
POTASSIUM SERPL-SCNC: 4 MMOL/L (ref 3.5–5)
PROTHROMBIN TIME: 13.1 SEC (ref 9.3–12.4)
RBC # BLD: 3.53 E12/L (ref 3.8–5.8)
SODIUM BLD-SCNC: 138 MMOL/L (ref 132–146)
TOTAL PROTEIN: 5.2 G/DL (ref 6.4–8.3)
WBC # BLD: 20.1 E9/L (ref 4.5–11.5)

## 2020-11-17 PROCEDURE — 0JBB0ZZ EXCISION OF PERINEUM SUBCUTANEOUS TISSUE AND FASCIA, OPEN APPROACH: ICD-10-PCS | Performed by: SURGERY

## 2020-11-17 PROCEDURE — 2500000003 HC RX 250 WO HCPCS

## 2020-11-17 PROCEDURE — 10061 I&D ABSCESS COMP/MULTIPLE: CPT | Performed by: SURGERY

## 2020-11-17 PROCEDURE — 3600000002 HC SURGERY LEVEL 2 BASE: Performed by: SURGERY

## 2020-11-17 PROCEDURE — 6370000000 HC RX 637 (ALT 250 FOR IP): Performed by: STUDENT IN AN ORGANIZED HEALTH CARE EDUCATION/TRAINING PROGRAM

## 2020-11-17 PROCEDURE — 6360000002 HC RX W HCPCS: Performed by: SURGERY

## 2020-11-17 PROCEDURE — 2580000003 HC RX 258: Performed by: STUDENT IN AN ORGANIZED HEALTH CARE EDUCATION/TRAINING PROGRAM

## 2020-11-17 PROCEDURE — 3600000012 HC SURGERY LEVEL 2 ADDTL 15MIN: Performed by: SURGERY

## 2020-11-17 PROCEDURE — 1200000000 HC SEMI PRIVATE

## 2020-11-17 PROCEDURE — 7100000000 HC PACU RECOVERY - FIRST 15 MIN: Performed by: SURGERY

## 2020-11-17 PROCEDURE — 36415 COLL VENOUS BLD VENIPUNCTURE: CPT

## 2020-11-17 PROCEDURE — 3700000001 HC ADD 15 MINUTES (ANESTHESIA): Performed by: SURGERY

## 2020-11-17 PROCEDURE — 6360000002 HC RX W HCPCS

## 2020-11-17 PROCEDURE — 3700000000 HC ANESTHESIA ATTENDED CARE: Performed by: SURGERY

## 2020-11-17 PROCEDURE — 85610 PROTHROMBIN TIME: CPT

## 2020-11-17 PROCEDURE — 6360000002 HC RX W HCPCS: Performed by: STUDENT IN AN ORGANIZED HEALTH CARE EDUCATION/TRAINING PROGRAM

## 2020-11-17 PROCEDURE — 2580000003 HC RX 258

## 2020-11-17 PROCEDURE — 7100000001 HC PACU RECOVERY - ADDTL 15 MIN: Performed by: SURGERY

## 2020-11-17 PROCEDURE — 85025 COMPLETE CBC W/AUTO DIFF WBC: CPT

## 2020-11-17 PROCEDURE — 6360000002 HC RX W HCPCS: Performed by: ANESTHESIOLOGY

## 2020-11-17 PROCEDURE — 2580000003 HC RX 258: Performed by: SURGERY

## 2020-11-17 PROCEDURE — 80053 COMPREHEN METABOLIC PANEL: CPT

## 2020-11-17 RX ORDER — ONDANSETRON 2 MG/ML
INJECTION INTRAMUSCULAR; INTRAVENOUS PRN
Status: DISCONTINUED | OUTPATIENT
Start: 2020-11-17 | End: 2020-11-17 | Stop reason: SDUPTHER

## 2020-11-17 RX ORDER — PROMETHAZINE HYDROCHLORIDE 25 MG/ML
6.25 INJECTION, SOLUTION INTRAMUSCULAR; INTRAVENOUS
Status: COMPLETED | OUTPATIENT
Start: 2020-11-17 | End: 2020-11-17

## 2020-11-17 RX ORDER — MEPERIDINE HYDROCHLORIDE 25 MG/ML
12.5 INJECTION INTRAMUSCULAR; INTRAVENOUS; SUBCUTANEOUS EVERY 5 MIN PRN
Status: DISCONTINUED | OUTPATIENT
Start: 2020-11-17 | End: 2020-11-17 | Stop reason: HOSPADM

## 2020-11-17 RX ORDER — PROPOFOL 10 MG/ML
INJECTION, EMULSION INTRAVENOUS PRN
Status: DISCONTINUED | OUTPATIENT
Start: 2020-11-17 | End: 2020-11-17 | Stop reason: SDUPTHER

## 2020-11-17 RX ORDER — HYDRALAZINE HYDROCHLORIDE 20 MG/ML
5 INJECTION INTRAMUSCULAR; INTRAVENOUS EVERY 10 MIN PRN
Status: DISCONTINUED | OUTPATIENT
Start: 2020-11-17 | End: 2020-11-17 | Stop reason: HOSPADM

## 2020-11-17 RX ORDER — DEXAMETHASONE SODIUM PHOSPHATE 10 MG/ML
INJECTION, SOLUTION INTRAMUSCULAR; INTRAVENOUS PRN
Status: DISCONTINUED | OUTPATIENT
Start: 2020-11-17 | End: 2020-11-17 | Stop reason: SDUPTHER

## 2020-11-17 RX ORDER — ROCURONIUM BROMIDE 10 MG/ML
INJECTION, SOLUTION INTRAVENOUS PRN
Status: DISCONTINUED | OUTPATIENT
Start: 2020-11-17 | End: 2020-11-17 | Stop reason: SDUPTHER

## 2020-11-17 RX ORDER — PROMETHAZINE HYDROCHLORIDE 25 MG/ML
INJECTION, SOLUTION INTRAMUSCULAR; INTRAVENOUS
Status: COMPLETED
Start: 2020-11-17 | End: 2020-11-17

## 2020-11-17 RX ORDER — SODIUM CHLORIDE 9 MG/ML
INJECTION, SOLUTION INTRAVENOUS CONTINUOUS PRN
Status: DISCONTINUED | OUTPATIENT
Start: 2020-11-17 | End: 2020-11-17 | Stop reason: SDUPTHER

## 2020-11-17 RX ORDER — GLYCOPYRROLATE 1 MG/5 ML
SYRINGE (ML) INTRAVENOUS PRN
Status: DISCONTINUED | OUTPATIENT
Start: 2020-11-17 | End: 2020-11-17 | Stop reason: SDUPTHER

## 2020-11-17 RX ORDER — LABETALOL HYDROCHLORIDE 5 MG/ML
5 INJECTION, SOLUTION INTRAVENOUS EVERY 10 MIN PRN
Status: DISCONTINUED | OUTPATIENT
Start: 2020-11-17 | End: 2020-11-17 | Stop reason: HOSPADM

## 2020-11-17 RX ORDER — NEOSTIGMINE METHYLSULFATE 1 MG/ML
INJECTION, SOLUTION INTRAVENOUS PRN
Status: DISCONTINUED | OUTPATIENT
Start: 2020-11-17 | End: 2020-11-17 | Stop reason: SDUPTHER

## 2020-11-17 RX ORDER — FENTANYL CITRATE 50 UG/ML
INJECTION, SOLUTION INTRAMUSCULAR; INTRAVENOUS PRN
Status: DISCONTINUED | OUTPATIENT
Start: 2020-11-17 | End: 2020-11-17 | Stop reason: SDUPTHER

## 2020-11-17 RX ORDER — LIDOCAINE HYDROCHLORIDE 20 MG/ML
INJECTION, SOLUTION INTRAVENOUS PRN
Status: DISCONTINUED | OUTPATIENT
Start: 2020-11-17 | End: 2020-11-17 | Stop reason: SDUPTHER

## 2020-11-17 RX ORDER — KETOROLAC TROMETHAMINE 30 MG/ML
30 INJECTION, SOLUTION INTRAMUSCULAR; INTRAVENOUS ONCE
Status: COMPLETED | OUTPATIENT
Start: 2020-11-17 | End: 2020-11-17

## 2020-11-17 RX ORDER — MIDAZOLAM HYDROCHLORIDE 1 MG/ML
INJECTION INTRAMUSCULAR; INTRAVENOUS PRN
Status: DISCONTINUED | OUTPATIENT
Start: 2020-11-17 | End: 2020-11-17 | Stop reason: SDUPTHER

## 2020-11-17 RX ADMIN — SODIUM CHLORIDE: 9 INJECTION, SOLUTION INTRAVENOUS at 00:45

## 2020-11-17 RX ADMIN — ONDANSETRON HYDROCHLORIDE 4 MG: 2 INJECTION, SOLUTION INTRAMUSCULAR; INTRAVENOUS at 10:31

## 2020-11-17 RX ADMIN — FLUCONAZOLE IN SODIUM CHLORIDE 400 MG: 2 INJECTION, SOLUTION INTRAVENOUS at 15:07

## 2020-11-17 RX ADMIN — Medication 0.6 MG: at 10:39

## 2020-11-17 RX ADMIN — Medication 10 ML: at 20:32

## 2020-11-17 RX ADMIN — HYDROMORPHONE HYDROCHLORIDE 0.5 MG: 1 INJECTION, SOLUTION INTRAMUSCULAR; INTRAVENOUS; SUBCUTANEOUS at 11:07

## 2020-11-17 RX ADMIN — SODIUM CHLORIDE: 9 INJECTION, SOLUTION INTRAVENOUS at 10:03

## 2020-11-17 RX ADMIN — MIDAZOLAM 2 MG: 1 INJECTION INTRAMUSCULAR; INTRAVENOUS at 10:03

## 2020-11-17 RX ADMIN — DEXAMETHASONE SODIUM PHOSPHATE 10 MG: 10 INJECTION, SOLUTION INTRAMUSCULAR; INTRAVENOUS at 10:14

## 2020-11-17 RX ADMIN — HYDROMORPHONE HYDROCHLORIDE 0.5 MG: 1 INJECTION, SOLUTION INTRAMUSCULAR; INTRAVENOUS; SUBCUTANEOUS at 11:30

## 2020-11-17 RX ADMIN — FENTANYL CITRATE 50 MCG: 50 INJECTION, SOLUTION INTRAMUSCULAR; INTRAVENOUS at 10:03

## 2020-11-17 RX ADMIN — SODIUM CHLORIDE, PRESERVATIVE FREE 10 ML: 5 INJECTION INTRAVENOUS at 15:10

## 2020-11-17 RX ADMIN — ACETAMINOPHEN 650 MG: 325 TABLET ORAL at 18:14

## 2020-11-17 RX ADMIN — PROMETHAZINE HYDROCHLORIDE 6.25 MG: 25 INJECTION, SOLUTION INTRAMUSCULAR; INTRAVENOUS at 11:07

## 2020-11-17 RX ADMIN — Medication 3 MG: at 10:39

## 2020-11-17 RX ADMIN — PROMETHAZINE HYDROCHLORIDE 6.25 MG: 25 INJECTION INTRAMUSCULAR; INTRAVENOUS at 11:07

## 2020-11-17 RX ADMIN — BUPRENORPHINE HYDROCHLORIDE AND NALOXONE HYDROCHLORIDE DIHYDRATE 1 TABLET: 8; 2 TABLET SUBLINGUAL at 21:54

## 2020-11-17 RX ADMIN — HYDROMORPHONE HYDROCHLORIDE 0.5 MG: 1 INJECTION, SOLUTION INTRAMUSCULAR; INTRAVENOUS; SUBCUTANEOUS at 11:36

## 2020-11-17 RX ADMIN — HYDROMORPHONE HYDROCHLORIDE 0.5 MG: 1 INJECTION, SOLUTION INTRAMUSCULAR; INTRAVENOUS; SUBCUTANEOUS at 11:00

## 2020-11-17 RX ADMIN — BUPRENORPHINE HYDROCHLORIDE AND NALOXONE HYDROCHLORIDE DIHYDRATE 1 TABLET: 8; 2 TABLET SUBLINGUAL at 15:07

## 2020-11-17 RX ADMIN — FENTANYL CITRATE 50 MCG: 50 INJECTION, SOLUTION INTRAMUSCULAR; INTRAVENOUS at 10:43

## 2020-11-17 RX ADMIN — PIPERACILLIN AND TAZOBACTAM 3.38 G: 3; .375 INJECTION, POWDER, LYOPHILIZED, FOR SOLUTION INTRAVENOUS at 04:09

## 2020-11-17 RX ADMIN — SODIUM CHLORIDE: 9 INJECTION, SOLUTION INTRAVENOUS at 19:06

## 2020-11-17 RX ADMIN — PIPERACILLIN AND TAZOBACTAM 3.38 G: 3; .375 INJECTION, POWDER, LYOPHILIZED, FOR SOLUTION INTRAVENOUS at 20:32

## 2020-11-17 RX ADMIN — KETOROLAC TROMETHAMINE 30 MG: 30 INJECTION, SOLUTION INTRAMUSCULAR; INTRAVENOUS at 11:30

## 2020-11-17 RX ADMIN — ACETAMINOPHEN 650 MG: 325 TABLET ORAL at 21:54

## 2020-11-17 RX ADMIN — LIDOCAINE HYDROCHLORIDE 80 MG: 20 INJECTION, SOLUTION INTRAVENOUS at 10:10

## 2020-11-17 RX ADMIN — PROPOFOL 200 MG: 10 INJECTION, EMULSION INTRAVENOUS at 10:10

## 2020-11-17 RX ADMIN — SODIUM CHLORIDE: 9 INJECTION, SOLUTION INTRAVENOUS at 08:53

## 2020-11-17 RX ADMIN — FENTANYL CITRATE 50 MCG: 50 INJECTION, SOLUTION INTRAMUSCULAR; INTRAVENOUS at 10:10

## 2020-11-17 RX ADMIN — FENTANYL CITRATE 50 MCG: 50 INJECTION, SOLUTION INTRAMUSCULAR; INTRAVENOUS at 10:38

## 2020-11-17 RX ADMIN — ROCURONIUM BROMIDE 15 MG: 10 INJECTION, SOLUTION INTRAVENOUS at 10:17

## 2020-11-17 RX ADMIN — FENTANYL CITRATE 50 MCG: 50 INJECTION, SOLUTION INTRAMUSCULAR; INTRAVENOUS at 10:08

## 2020-11-17 RX ADMIN — ROCURONIUM BROMIDE 35 MG: 10 INJECTION, SOLUTION INTRAVENOUS at 10:10

## 2020-11-17 RX ADMIN — PIPERACILLIN AND TAZOBACTAM 3.38 G: 3; .375 INJECTION, POWDER, LYOPHILIZED, FOR SOLUTION INTRAVENOUS at 15:08

## 2020-11-17 ASSESSMENT — PULMONARY FUNCTION TESTS
PIF_VALUE: 17
PIF_VALUE: 15
PIF_VALUE: 1
PIF_VALUE: 18
PIF_VALUE: 17
PIF_VALUE: 1
PIF_VALUE: 18
PIF_VALUE: 18
PIF_VALUE: 10
PIF_VALUE: 18
PIF_VALUE: 16
PIF_VALUE: 2
PIF_VALUE: 10
PIF_VALUE: 16
PIF_VALUE: 15
PIF_VALUE: 16
PIF_VALUE: 17
PIF_VALUE: 18
PIF_VALUE: 0
PIF_VALUE: 1
PIF_VALUE: 12
PIF_VALUE: 10
PIF_VALUE: 17
PIF_VALUE: 15
PIF_VALUE: 1
PIF_VALUE: 3
PIF_VALUE: 19
PIF_VALUE: 3
PIF_VALUE: 19
PIF_VALUE: 0
PIF_VALUE: 4
PIF_VALUE: 20
PIF_VALUE: 18
PIF_VALUE: 1
PIF_VALUE: 10
PIF_VALUE: 18
PIF_VALUE: 16
PIF_VALUE: 17
PIF_VALUE: 17
PIF_VALUE: 18

## 2020-11-17 ASSESSMENT — PAIN DESCRIPTION - ORIENTATION
ORIENTATION: RIGHT

## 2020-11-17 ASSESSMENT — PAIN - FUNCTIONAL ASSESSMENT: PAIN_FUNCTIONAL_ASSESSMENT: ACTIVITIES ARE NOT PREVENTED

## 2020-11-17 ASSESSMENT — PAIN DESCRIPTION - FREQUENCY
FREQUENCY: CONTINUOUS

## 2020-11-17 ASSESSMENT — PAIN DESCRIPTION - PAIN TYPE
TYPE: SURGICAL PAIN
TYPE: SURGICAL PAIN
TYPE: ACUTE PAIN;SURGICAL PAIN

## 2020-11-17 ASSESSMENT — PAIN SCALES - GENERAL
PAINLEVEL_OUTOF10: 2
PAINLEVEL_OUTOF10: 0
PAINLEVEL_OUTOF10: 8
PAINLEVEL_OUTOF10: 3
PAINLEVEL_OUTOF10: 3
PAINLEVEL_OUTOF10: 8
PAINLEVEL_OUTOF10: 0
PAINLEVEL_OUTOF10: 0

## 2020-11-17 ASSESSMENT — PAIN DESCRIPTION - DESCRIPTORS
DESCRIPTORS: ACHING;BURNING;CONSTANT;SORE
DESCRIPTORS: ACHING;BURNING;CONSTANT
DESCRIPTORS: ACHING;BURNING;CONSTANT

## 2020-11-17 ASSESSMENT — PAIN DESCRIPTION - ONSET: ONSET: ON-GOING

## 2020-11-17 ASSESSMENT — PAIN DESCRIPTION - LOCATION
LOCATION: GROIN
LOCATION: PERINEUM;SCROTUM
LOCATION: GROIN

## 2020-11-17 ASSESSMENT — PAIN DESCRIPTION - PROGRESSION: CLINICAL_PROGRESSION: NOT CHANGED

## 2020-11-17 ASSESSMENT — LIFESTYLE VARIABLES: SMOKING_STATUS: 1

## 2020-11-17 NOTE — OP NOTE
Operative Note      Patient: Aurea Clayton  YOB: 1980  MRN: [id number]    Date of Procedure: 11/17/2020    Pre-Op Diagnosis: Necrotizing soft tissue infection    Post-Op Diagnosis: Same       Procedure(s):  DEBRIDEMENT RIGHT GROIN WOUND SKIN AND SUBCUTANEOUS TISSUE    Surgeon(s):  Phil Fine MD    Assistant:   Resident: Caprice Schwab DO; Velasquez Whitfield MD    Anesthesia: General    Estimated Blood Loss (mL): less than 50     Complications: None    Specimens:   * No specimens in log *    Implants:  * No implants in log *      Drains:   Open Drain Midline Perineal 0.5 [place] Brenda (Active)   Site Description Unable to view 11/15/20 1745   Dressing Status Clean;Dry; Intact 11/16/20 0713   Drainage Appearance Serosanguinous 11/15/20 1930   Status Other (Comment) 11/16/20 0713       Urethral Catheter (Active)   Catheter Indications Perioperative use in selected surgeries including but not limited to urologic, pelvic or need for intraoperative monitoring of urinary output due to prolonged surgery, large volume infusion or need for diuretic therapy in surgery 11/16/20 1650   Securement Device Date Changed 11/13/20 11/13/20 2000   Site Assessment No urethral drainage 11/15/20 1200   Urine Color Michelle 11/17/20 0730   Urine Appearance Clear 11/17/20 0730   Output (mL) 250 mL 11/17/20 0730         Detailed Description of Procedure: The patient was brought into the operating room and placed on the operating table in a supine position. The patient was identified and the procedure was confirmed. The wound and surrounding area was prepped and draped in sterile fashion. A counter incision lateral to the existing  the wound was made with a #15 blade scalpel. A tunnel was formed from the existing wound to the counter incision and all purulent material was suctioned from the abscess pocket. All necrotic skin and subcutaneous tissue was debrided down to the layer of the muscle.    Wound was copiously irrigated with normal saline solution. There was minimal bleeding that was controlled with pressure and electrocautery. Dressing consisting of Dakin's soaked kerlix and a heavy drainage pack was applied to the wound. Needle, sponge and instrument counts were reported as correct x2. The patient tolerated the procedure and was transferred to the recovery area in satisfactory condition. Dr. Valarie Mcgrath was present and scrubbed throughout the entire procedure.       Electronically signed by Ayah Vicente MD on 11/17/2020 at 10:50 AM

## 2020-11-17 NOTE — PROGRESS NOTES
Department of Internal Medicine  Infectious Diseases  Progress Note    C/C :  Perineal abscess, sepsis     Pt is awake and alert  Denies fever or chills   Pain -less today   Afebrile       Current Facility-Administered Medications   Medication Dose Route Frequency Provider Last Rate Last Dose    HYDROmorphone (DILAUDID) injection 0.5 mg  0.5 mg Intravenous Q4H PRN Nasir Jaquez MD        fluconazole (DIFLUCAN) in 0.9 % sodium chloride IVPB 400 mg  400 mg Intravenous Q24H Nasir Jaquez  mL/hr at 11/16/20 1420 400 mg at 11/16/20 1420    zinc sulfate (ZINCATE) capsule 50 mg  50 mg Oral Daily Nasir Jaquez MD   50 mg at 11/16/20 1010    vitamin C (ASCORBIC ACID) tablet 500 mg  500 mg Oral Daily Nasir Jaquez MD   500 mg at 11/16/20 1010    sodium hypochlorite (DAKINS) external solution   Irrigation Daily Nasir Jaquez MD        sodium chloride flush 0.9 % injection 10 mL  10 mL Intravenous 2 times per day Nasir Jaquez MD   10 mL at 11/16/20 2032    sodium chloride flush 0.9 % injection 10 mL  10 mL Intravenous PRN Nasir Jaquez MD   10 mL at 11/16/20 1421    ondansetron (ZOFRAN) injection 4 mg  4 mg Intravenous Q6H PRN Nasir Jaquez MD        enoxaparin (LOVENOX) injection 40 mg  40 mg Subcutaneous Daily Nasir Jaquez MD   40 mg at 11/16/20 1008    oxyCODONE (ROXICODONE) immediate release tablet 5 mg  5 mg Oral Q4H PRN Nasir Jaquez MD        Or   Sophia Mejias oxyCODONE HCl (OXY-IR) immediate release tablet 10 mg  10 mg Oral Q4H PRN Nasir Jaquez MD   10 mg at 11/16/20 3926    acetaminophen (TYLENOL) tablet 650 mg  650 mg Oral Q6H Nasir Jaquez MD   650 mg at 11/16/20 2031    buprenorphine-naloxone (SUBOXONE) 8-2 MG SL tablet 1 tablet  1 tablet Sublingual BID Nasir Jaquez MD   1 tablet at 11/16/20 2031    piperacillin-tazobactam (ZOSYN) 3.375 g in dextrose 5 % 100 mL IVPB extended infusion (mini-bag)  3.375 g Intravenous Q8H Nasir Jaquez MD   Stopped at 11/17/20 0854    Post Zosyn Flush (0.9 % sodium chloride infusion)   Intravenous Q8H Lenny Baker MD   Stopped at 11/17/20 1228       REVIEW OF SYSTEMS:    CONSTITUTIONAL:  Denies fever  HEENT: denies blurring of vision or double vision, denies hearing problem  RESPIRATORY: denies cough, shortness of breath, sputum expectoration, chest pain. CARDIOVASCULAR:  Denies palpitation  GASTROINTESTINAL:  Denies abdomen pain, diarrhea or constipation. GENITOURINARY:  Denies burning urination or frequency of urination  INTEGUMENT: Right groin wound   HEMATOLOGIC/LYMPHATIC:  Denies lymph node swelling, gum bleeding  MUSCULOSKELETAL:  Pain in the right groin, scrotum   NEUROLOGICAL:  Weakness     PHYSICAL EXAM:      Vitals:     /66   Pulse 53   Temp 97.8 °F (36.6 °C)   Resp 16   Ht 5' 10\" (1.778 m)   Wt 180 lb (81.6 kg)   SpO2 96%   BMI 25.83 kg/m²     General Appearance:    Awake, alert , no acute distress. Head:    Normocephalic, atraumatic   Eyes:    No pallor, no icterus,   Ears:    No obvious deformity or drainage.    Nose:   No nasal drainage   Throat:   Mucosa moist, no oral thrush   Neck:   Supple, no lymphadenopathy   Back:     no CVA tenderness   Lungs:     Clear to auscultation bilaterally, no wheeze    Heart:    Regular rate and rhythm, no murmur, rub or gallop   Abdomen:     Soft, non-tender, bowel sounds present    Extremities:   No edema, no cyanosis   Right tight tender, erythematous, wound packed with dressing    Pulses:   Dorsalis pedis palpable    Skin:   Right thigh wound - dressed         Lab Results   Component Value Date    WBC 20.1 11/17/2020    RBC 3.53 11/17/2020    HGB 10.1 11/17/2020    HCT 31.0 11/17/2020     11/17/2020    MCV 87.8 11/17/2020    MCH 28.6 11/17/2020    MCHC 32.6 11/17/2020    RDW 16.6 11/17/2020    NRBC 0.9 11/12/2020    BLASTSPCT 0.9 11/17/2020    METASPCT 0.9 11/17/2020    LYMPHOPCT 14.8 11/17/2020    MONOPCT 4.3 11/17/2020    MYELOPCT 3.5 11/17/2020 BASOPCT 0.7 11/17/2020    MONOSABS 0.80 11/17/2020    LYMPHSABS 3.02 11/17/2020    EOSABS 0.34 11/17/2020    BASOSABS 0.00 11/17/2020       CMP     Lab Results   Component Value Date     11/17/2020    K 4.0 11/17/2020    K 3.4 11/12/2020     11/17/2020    CO2 32 11/17/2020    BUN 16 11/17/2020    CREATININE 0.8 11/17/2020    GFRAA >60 11/17/2020    LABGLOM >60 11/17/2020    GLUCOSE 110 11/17/2020    PROT 5.2 11/17/2020    LABALBU 2.1 11/17/2020    CALCIUM 7.9 11/17/2020    BILITOT 0.7 11/17/2020    ALKPHOS 115 11/17/2020    AST 44 11/17/2020    ALT 40 11/17/2020         Hepatic Function Panel:    Lab Results   Component Value Date    ALKPHOS 115 11/17/2020    ALT 40 11/17/2020    AST 44 11/17/2020    PROT 5.2 11/17/2020    BILITOT 0.7 11/17/2020    LABALBU 2.1 11/17/2020       PT/INR:    Lab Results   Component Value Date    PROTIME 13.1 11/17/2020    INR 1.2 11/17/2020       TSH:  No results found for: TSH    U/A:  No results found for: NITRITE, COLORU, PHUR, LABCAST, WBCUA, RBCUA, MUCUS, TRICHOMONAS, YEAST, BACTERIA, CLARITYU, SPECGRAV, LEUKOCYTESUR, UROBILINOGEN, BILIRUBINUR, BLOODU, GLUCOSEU, AMORPHOUS    ABG:  No results found for: BDJ7HJO, BEART, A7MIIJYW, PHART, THGBART, VMN5SLE, PO2ART, ZAF8BHT    MICROBIOLOGY:    Wound Culture -  Abnormal)   Collected: 11/13/20 0136    Order Status: Completed  Specimen: Specimen from Groin  Updated: 11/15/20 0925     Organism  Escherichia coliAbnormal       Culture Surgical  Heavy growth     Organism  Candida albicansAbnormal       Culture Surgical  Light growth    Narrative:              IMPRESSION:     1. Perianal abscess , necrotizing soft tissue infection,  sepsis s/p multiple debridement   2. Leukocytosis, leukemoid reaction - WBC 20 K     RECOMMENDATIONS:     1. Zosyn 3.375 grams IV q 8 hrs   2. Diflucan 400 mg IV to PO  q 24 hrs   3.  Wound care /CBC with diff

## 2020-11-17 NOTE — PROGRESS NOTES
Patient seen and examined this AM  All questions answered for OR  Cardiac: RR  Lungs: no increased work of breathing    For debridement today    Electronically signed by Cindi Randolph DO on 11/17/2020 at 10:00 AM

## 2020-11-17 NOTE — ANESTHESIA POSTPROCEDURE EVALUATION
Department of Anesthesiology  Postprocedure Note    Patient: Loc Chiu  MRN: [id number]  YOB: 1980  Date of evaluation: 11/17/2020  Time:  11:43 AM     Procedure Summary     Date:  11/17/20 Room / Location:  [facility] OR 04 / [facility]    Anesthesia Start:  1003 Anesthesia Stop:  1054    Procedure:  [address] (Right ) Diagnosis:  (.)    Surgeon:  Beatriz Cortez MD Responsible Provider:  Murali Silverio MD    Anesthesia Type:  general ASA Status:  2          Anesthesia Type: general    Ambar Phase I: Ambar Score: 10    Ambar Phase II:      Last vitals: Reviewed and per EMR flowsheets.        Anesthesia Post Evaluation    Patient location during evaluation: PACU  Patient participation: complete - patient participated  Level of consciousness: awake and alert  Airway patency: patent  Nausea & Vomiting: no nausea and no vomiting  Complications: no  Cardiovascular status: hemodynamically stable and blood pressure returned to baseline  Respiratory status: acceptable  Hydration status: euvolemic

## 2020-11-17 NOTE — ANESTHESIA PRE PROCEDURE
Department of Anesthesiology  Preprocedure Note       Name:  Robi Mitchell   Age:  37 y.o.  :  1980                                          MRN:  [id number]         Date:  2020      Surgeon: Irena Stark):  Juan Christian MD    Procedure: Procedure(s):  DEBRIDEMENT RIGHT GROIN WOUND    Medications prior to admission:   Prior to Admission medications    Medication Sig Start Date End Date Taking? Authorizing Provider   buprenorphine-naloxone (SUBOXONE) 8-2 MG SUBL SL tablet Place 1 tablet under the tongue 2 times daily.    Yes Historical Provider, MD       Current medications:    Current Facility-Administered Medications   Medication Dose Route Frequency Provider Last Rate Last Dose    HYDROmorphone (DILAUDID) injection 0.5 mg  0.5 mg Intravenous Q4H PRN Ivett E Kaercher, DO        fluconazole (DIFLUCAN) in 0.9 % sodium chloride IVPB 400 mg  400 mg Intravenous Q24H Maren Brito  mL/hr at 20 1420 400 mg at 20 1420    zinc sulfate (ZINCATE) capsule 50 mg  50 mg Oral Daily Ivett E Kaercher, DO   50 mg at 20 1010    vitamin C (ASCORBIC ACID) tablet 500 mg  500 mg Oral Daily Ivett E Kaercher, DO   500 mg at 20 1010    sodium hypochlorite (DAKINS) external solution   Irrigation Daily Ivett E Kaercher, DO        sodium chloride flush 0.9 % injection 10 mL  10 mL Intravenous 2 times per day Jerrell Jess, DO   10 mL at 20 2032    sodium chloride flush 0.9 % injection 10 mL  10 mL Intravenous PRN Ivett E Kaercher, DO   10 mL at 20 1421    ondansetron (ZOFRAN) injection 4 mg  4 mg Intravenous Q6H PRN Ivett E Kaercher, DO        enoxaparin (LOVENOX) injection 40 mg  40 mg Subcutaneous Daily Ivett E Kaercher, DO   40 mg at 20 1008    oxyCODONE (ROXICODONE) immediate release tablet 5 mg  5 mg Oral Q4H PRN Ivett E Kaercher, DO        Or    oxyCODONE HCl (OXY-IR) immediate release tablet 10 mg  10 mg Oral Q4H PRN Ivett E Kaercher, DO   10 mg 16 16   Temp: 36.5 °C (97.7 °F) 36.6 °C (97.8 °F) 36.6 °C (97.8 °F) 36.7 °C (98 °F)   TempSrc: Temporal Temporal Temporal Temporal   SpO2: 95% 94%     Weight:       Height:                                                  BP Readings from Last 3 Encounters:   11/17/20 112/74   11/15/20 112/65   11/13/20 132/73       NPO Status: Time of last liquid consumption: 0000                        Time of last solid consumption: 2350                        Date of last liquid consumption: 11/15/20                        Date of last solid food consumption: 11/14/20    BMI:   Wt Readings from Last 3 Encounters:   11/12/20 180 lb (81.6 kg)     Body mass index is 25.83 kg/m². CBC:   Lab Results   Component Value Date    WBC 20.1 11/17/2020    RBC 3.53 11/17/2020    HGB 10.1 11/17/2020    HCT 31.0 11/17/2020    MCV 87.8 11/17/2020    RDW 16.6 11/17/2020     11/17/2020       CMP:   Lab Results   Component Value Date     11/17/2020    K 4.0 11/17/2020    K 3.4 11/12/2020     11/17/2020    CO2 32 11/17/2020    BUN 16 11/17/2020    CREATININE 0.8 11/17/2020    GFRAA >60 11/17/2020    LABGLOM >60 11/17/2020    GLUCOSE 110 11/17/2020    PROT 5.2 11/17/2020    CALCIUM 7.9 11/17/2020    BILITOT 0.7 11/17/2020    ALKPHOS 115 11/17/2020    AST 44 11/17/2020    ALT 40 11/17/2020       POC Tests: No results for input(s): POCGLU, POCNA, POCK, POCCL, POCBUN, POCHEMO, POCHCT in the last 72 hours.     Coags:   Lab Results   Component Value Date    PROTIME 13.1 11/17/2020    INR 1.2 11/17/2020    APTT 26.4 11/12/2020       HCG (If Applicable): No results found for: PREGTESTUR, PREGSERUM, HCG, HCGQUANT     ABGs: No results found for: PHART, PO2ART, TPB3MCY, ERW1GLH, BEART, N8TLZRDJ     Type & Screen (If Applicable):  No results found for: LABABO, LABRH    Drug/Infectious Status (If Applicable):  No results found for: HIV, HEPCAB    COVID-19 Screening (If Applicable): No results found for: COVID19      Anesthesia

## 2020-11-17 NOTE — CARE COORDINATION
11/17/2020 SOCIAL WORK TRANSITION OF CARE PLANNING  Sw followed up with [facility]-still pending. Sw made referral to guardian faye [facility]-will await response. Pt plan is home, awaiting final atb plan. Skye declined referral. [facility]-declined.   Electronically signed by SEGUNDO Delgado on 11/17/2020 at 12:42 PM

## 2020-11-18 LAB
ALBUMIN SERPL-MCNC: 2.3 G/DL (ref 3.5–5.2)
ALP BLD-CCNC: 121 U/L (ref 40–129)
ALT SERPL-CCNC: 30 U/L (ref 0–40)
ANION GAP SERPL CALCULATED.3IONS-SCNC: 7 MMOL/L (ref 7–16)
ANISOCYTOSIS: ABNORMAL
AST SERPL-CCNC: 21 U/L (ref 0–39)
BASOPHILS ABSOLUTE: 0 E9/L (ref 0–0.2)
BASOPHILS RELATIVE PERCENT: 0.5 % (ref 0–2)
BILIRUB SERPL-MCNC: 0.6 MG/DL (ref 0–1.2)
BLOOD CULTURE, ROUTINE: NORMAL
BUN BLDV-MCNC: 19 MG/DL (ref 6–20)
CALCIUM SERPL-MCNC: 8.1 MG/DL (ref 8.6–10.2)
CHLORIDE BLD-SCNC: 104 MMOL/L (ref 98–107)
CO2: 29 MMOL/L (ref 22–29)
CREAT SERPL-MCNC: 0.8 MG/DL (ref 0.7–1.2)
CULTURE, BLOOD 2: NORMAL
EOSINOPHILS ABSOLUTE: 0.17 E9/L (ref 0.05–0.5)
EOSINOPHILS RELATIVE PERCENT: 0.9 % (ref 0–6)
GFR AFRICAN AMERICAN: >60
GFR NON-AFRICAN AMERICAN: >60 ML/MIN/1.73
GLUCOSE BLD-MCNC: 157 MG/DL (ref 74–99)
HCT VFR BLD CALC: 31.7 % (ref 37–54)
HEMOGLOBIN: 10.2 G/DL (ref 12.5–16.5)
HYPOCHROMIA: ABNORMAL
LYMPHOCYTES ABSOLUTE: 1.36 E9/L (ref 1.5–4)
LYMPHOCYTES RELATIVE PERCENT: 7 % (ref 20–42)
MCH RBC QN AUTO: 28.3 PG (ref 26–35)
MCHC RBC AUTO-ENTMCNC: 32.2 % (ref 32–34.5)
MCV RBC AUTO: 88.1 FL (ref 80–99.9)
MONOCYTES ABSOLUTE: 1.75 E9/L (ref 0.1–0.95)
MONOCYTES RELATIVE PERCENT: 8.7 % (ref 2–12)
MYELOCYTE PERCENT: 4.3 % (ref 0–0)
NEUTROPHILS ABSOLUTE: 16.1 E9/L (ref 1.8–7.3)
NEUTROPHILS RELATIVE PERCENT: 79.1 % (ref 43–80)
OVALOCYTES: ABNORMAL
PDW BLD-RTO: 16.8 FL (ref 11.5–15)
PLATELET # BLD: 710 E9/L (ref 130–450)
PMV BLD AUTO: 9.7 FL (ref 7–12)
POIKILOCYTES: ABNORMAL
POLYCHROMASIA: ABNORMAL
POTASSIUM SERPL-SCNC: 4.8 MMOL/L (ref 3.5–5)
RBC # BLD: 3.6 E12/L (ref 3.8–5.8)
SODIUM BLD-SCNC: 140 MMOL/L (ref 132–146)
TARGET CELLS: ABNORMAL
TOTAL PROTEIN: 5.7 G/DL (ref 6.4–8.3)
WBC # BLD: 19.4 E9/L (ref 4.5–11.5)

## 2020-11-18 PROCEDURE — 6360000002 HC RX W HCPCS: Performed by: STUDENT IN AN ORGANIZED HEALTH CARE EDUCATION/TRAINING PROGRAM

## 2020-11-18 PROCEDURE — 6370000000 HC RX 637 (ALT 250 FOR IP): Performed by: STUDENT IN AN ORGANIZED HEALTH CARE EDUCATION/TRAINING PROGRAM

## 2020-11-18 PROCEDURE — 80053 COMPREHEN METABOLIC PANEL: CPT

## 2020-11-18 PROCEDURE — 99024 POSTOP FOLLOW-UP VISIT: CPT | Performed by: SURGERY

## 2020-11-18 PROCEDURE — 85025 COMPLETE CBC W/AUTO DIFF WBC: CPT

## 2020-11-18 PROCEDURE — 97535 SELF CARE MNGMENT TRAINING: CPT

## 2020-11-18 PROCEDURE — 36415 COLL VENOUS BLD VENIPUNCTURE: CPT

## 2020-11-18 PROCEDURE — 2580000003 HC RX 258: Performed by: STUDENT IN AN ORGANIZED HEALTH CARE EDUCATION/TRAINING PROGRAM

## 2020-11-18 PROCEDURE — 97530 THERAPEUTIC ACTIVITIES: CPT

## 2020-11-18 PROCEDURE — 1200000000 HC SEMI PRIVATE

## 2020-11-18 RX ADMIN — SODIUM CHLORIDE: 9 INJECTION, SOLUTION INTRAVENOUS at 01:46

## 2020-11-18 RX ADMIN — SODIUM CHLORIDE: 9 INJECTION, SOLUTION INTRAVENOUS at 08:00

## 2020-11-18 RX ADMIN — HYDROMORPHONE HYDROCHLORIDE 0.5 MG: 1 INJECTION, SOLUTION INTRAMUSCULAR; INTRAVENOUS; SUBCUTANEOUS at 20:02

## 2020-11-18 RX ADMIN — FLUCONAZOLE IN SODIUM CHLORIDE 400 MG: 2 INJECTION, SOLUTION INTRAVENOUS at 17:17

## 2020-11-18 RX ADMIN — Medication 10 ML: at 20:54

## 2020-11-18 RX ADMIN — SODIUM CHLORIDE: 9 INJECTION, SOLUTION INTRAVENOUS at 17:18

## 2020-11-18 RX ADMIN — ACETAMINOPHEN 650 MG: 325 TABLET ORAL at 09:18

## 2020-11-18 RX ADMIN — Medication 10 ML: at 09:19

## 2020-11-18 RX ADMIN — PIPERACILLIN AND TAZOBACTAM 3.38 G: 3; .375 INJECTION, POWDER, LYOPHILIZED, FOR SOLUTION INTRAVENOUS at 20:53

## 2020-11-18 RX ADMIN — BUPRENORPHINE HYDROCHLORIDE AND NALOXONE HYDROCHLORIDE DIHYDRATE 1 TABLET: 8; 2 TABLET SUBLINGUAL at 09:18

## 2020-11-18 RX ADMIN — PIPERACILLIN AND TAZOBACTAM 3.38 G: 3; .375 INJECTION, POWDER, LYOPHILIZED, FOR SOLUTION INTRAVENOUS at 04:26

## 2020-11-18 RX ADMIN — SODIUM CHLORIDE, PRESERVATIVE FREE 10 ML: 5 INJECTION INTRAVENOUS at 20:02

## 2020-11-18 RX ADMIN — ENOXAPARIN SODIUM 40 MG: 40 INJECTION SUBCUTANEOUS at 09:18

## 2020-11-18 RX ADMIN — ACETAMINOPHEN 650 MG: 325 TABLET ORAL at 14:48

## 2020-11-18 RX ADMIN — PIPERACILLIN AND TAZOBACTAM 3.38 G: 3; .375 INJECTION, POWDER, LYOPHILIZED, FOR SOLUTION INTRAVENOUS at 12:15

## 2020-11-18 RX ADMIN — OXYCODONE HYDROCHLORIDE AND ACETAMINOPHEN 500 MG: 500 TABLET ORAL at 09:18

## 2020-11-18 RX ADMIN — OXYCODONE HYDROCHLORIDE 10 MG: 10 TABLET ORAL at 17:30

## 2020-11-18 RX ADMIN — HYOSCYAMINE SULFATE: 16 SOLUTION at 09:39

## 2020-11-18 RX ADMIN — ACETAMINOPHEN 650 MG: 325 TABLET ORAL at 20:53

## 2020-11-18 RX ADMIN — SODIUM CHLORIDE, PRESERVATIVE FREE 10 ML: 5 INJECTION INTRAVENOUS at 04:26

## 2020-11-18 RX ADMIN — Medication 50 MG: at 09:18

## 2020-11-18 RX ADMIN — BUPRENORPHINE HYDROCHLORIDE AND NALOXONE HYDROCHLORIDE DIHYDRATE 1 TABLET: 8; 2 TABLET SUBLINGUAL at 20:53

## 2020-11-18 ASSESSMENT — PAIN SCALES - GENERAL
PAINLEVEL_OUTOF10: 6
PAINLEVEL_OUTOF10: 7
PAINLEVEL_OUTOF10: 2
PAINLEVEL_OUTOF10: 2
PAINLEVEL_OUTOF10: 0
PAINLEVEL_OUTOF10: 0
PAINLEVEL_OUTOF10: 2

## 2020-11-18 ASSESSMENT — PAIN DESCRIPTION - LOCATION: LOCATION: ABDOMEN;GROIN

## 2020-11-18 ASSESSMENT — PAIN DESCRIPTION - PAIN TYPE: TYPE: ACUTE PAIN;SURGICAL PAIN

## 2020-11-18 ASSESSMENT — PAIN DESCRIPTION - ORIENTATION: ORIENTATION: RIGHT

## 2020-11-18 ASSESSMENT — PAIN DESCRIPTION - DESCRIPTORS: DESCRIPTORS: ACHING;BURNING;DISCOMFORT

## 2020-11-18 NOTE — PROGRESS NOTES
Suzie   using AE as needed for safe reach/ energy conservation        Toileting NT Independent  Clothing management                        Suzie      Bed Mobility  Supine to sit: SBA with  HOB elevated     Sit to supine: NT  Independent  Supine<->sit                       IND  in prep of ADL tasks & transfers   Functional Transfers Sit to stand: CGA     Stand to sit: CGA  Independent- sit<->stand                       Suzie  sit<>stand/functional bathroom transfers using AD/DME as needed for balance and safety   Functional Mobility CGA  No device; occasional unsteadiness   Independent  Home distance using no AD                      Suzie   functional/bathroom mobility using AD as needed & demonstrating G safety      Balance Sitting:     Static:  S    Dynamic:SBA  Standing: CGA-SBA Sitting:     Static:  Ind    Dynamic: Ind  Standing: ind  Suzie dynamic sitting balance; Suzie dynamic standing balance  during ADL tasks & transfers   Endurance/Activity Tolerance    F tolerance with moderate activity.   Good G   tolerance with moderate activity/self care routine   Visual/  Perceptual    WFL                                 Comments: Upon arrival pt supine in bed. Discussed home set up with pt, giving suggestions to increase safety at discharge. At end of session pt left seated EOB, call light within reach. · Pt has made good progress towards set goals.      · Discharge from OT at this time due to independence with ADL's and functional transfers    Treatment Time In: 3:10            Treatment Time Out: 3:20             Treatment Charges: Mins Units   Ther Ex  [id number]     Manual Therapy 65769     Thera Activities 72041     ADL/Home Mgt 50002 10 1   Neuro Re-ed 53707     Group Therapy      Orthotic manage/training  08238     Non-Billable Time     Total Timed Treatment 10 [address], [address]

## 2020-11-18 NOTE — PROGRESS NOTES
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 - ABX per ID, appreciate input   - local wound care, d/c lozano   -  for HHC and d/c planning,   - given hx of UC will make referral to GI     VTE Prophylaxis: scds, lovenox.      Federico Sanches MD FACS     9:13 AM

## 2020-11-18 NOTE — PROGRESS NOTES
Physical Therapy    Name: Johnson Morales  : 1980  MRN: [id number]     Referring Provider:  Kamran Ramsey DO      Date of Service: 2020     Supervising Therapist: Brenda Parker PT [place] [address]       [id number]  Diagnosis:  Necrotizing soft tissue infection  Precautions: Falls  Procedure/Surgery:   excisional drainage of R inguinal, scrotum, perineum, I&D rectum with penrose placement  PMHx/PSHx:  Ulcerative colitis  Equipment Needs:  none     SUBJECTIVE:     Pt lives with spouse in a 2 story home with 4 stairs to enter and no rail. Bedroom/bathroom on 2nd floor with 17 stairs and 1 rail. Pt ambulated with no AD PTA.     OBJECTIVE:    Initial Evaluation  Date:  Treatment   Short Term/ Long Term   Goals   AM-PAC 6 Clicks  41/60      Was pt agreeable to Eval/treatment? yes  yes     Does pt have pain? Denies        Bed Mobility  Rolling: NT  Supine to sit: SBA with HOB elevated   Sit to supine: NT  Scooting: SBA  Ind for bed mobility Independent    Transfers Sit to stand: CGA  Stand to sit: CGA  Stand pivot: CGA no AD  Sit to stand: Sup  Stand to sit: Sup  Stand pivot: Sup no AD Independent    Ambulation   200 feet with no AD CGA  Sup 150 no device. >400 feet with no AD Independent    Stair negotiation: ascended and descended NT  NT >17 steps with 1 rail Mod I   ROM BUE:  Defer to OT note  BLE:  WFL       Strength BUE:  Defer to OT note  BLE:  5/5       Balance Sitting EOB:  SBA  Dynamic Standing:  CGA no AD   Sitting EOB:  Independent   Dynamic Standing:  Independent       Pt is A & O x 3  Sensation:  WFL  Edema:  Unremarkable         Therapeutic Exercises:  Functional activity     Patient education  Pt educated on role of PT, sequencing of transfers     Patient response to education:   Pt verbalized understanding Pt demonstrated skill Pt requires further education in this area   yes yes reinforce      ASSESSMENT:     Comments:  RN reported pt was stable for session.   See above for functional mobility. Sup in room for gait with good balance. No LOB noted when ambulating. Will be added to gait team. Continues to use wide TABATHA for gait due to discomfort. All lines remained intact. RN notified of assessment. Treatment:  Patient practiced and was instructed in the following treatment:    Pt performed functional tasks as stated above  Pt's/ family goals   1. None stated     Patient and or family understand(s) diagnosis, prognosis, and plan of care. yes     PLAN OF CARE:     Current Treatment Recommendations      []? Strengthening     []? ROM   [x]? Balance Training   [x]? Endurance Training   [x]? Transfer Training   [x]? Gait Training   [x]? Stair Training   []? Positioning   [x]? Safety and Education Training   []? Patient/Caregiver Education   []? HEP  []? Other      Frequency of treatments: 2-5x/week x 1-2 weeks.     Time in  1425  Time out  1450     Total Treatment Time  25 minutes      Evaluation Time includes thorough review of current medical information, gathering information on past medical history/social history and prior level of function, completion of standardized testing/informal observation of tasks, assessment of data and education on plan of care and goals.     CPT codes:  []? Low Complexity PT evaluation [id number]  []? Moderate Complexity PT evaluation 60305  []? High Complexity PT evaluation [id number]  []? PT Re-evaluation [id number]  []? Gait training 19816 - minutes  []? Manual therapy 90795 - minutes  [x]? Therapeutic activities 08056 25 minutes  []? Therapeutic exercises 14957 - minutes  []?  Neuromuscular reeducation 81659 - minutes      Connie Bumpers, [address] [facility]

## 2020-11-18 NOTE — PROGRESS NOTES
Department of Internal Medicine  Infectious Diseases  Progress Note    C/C :  Perineal abscess, sepsis , necrotizing soft tissue infection     Pt is awake and alert  Denies fever or chills   Pain controlled     Afebrile       Current Facility-Administered Medications   Medication Dose Route Frequency Provider Last Rate Last Dose    HYDROmorphone (DILAUDID) injection 0.5 mg  0.5 mg Intravenous Q4H PRN Ned Monterroso MD        fluconazole (DIFLUCAN) in 0.9 % sodium chloride IVPB 400 mg  400 mg Intravenous Q24H Ned Monterroso  mL/hr at 11/17/20 1507 400 mg at 11/17/20 1507    zinc sulfate (ZINCATE) capsule 50 mg  50 mg Oral Daily Ned Monterroso MD   50 mg at 11/18/20 8693    vitamin C (ASCORBIC ACID) tablet 500 mg  500 mg Oral Daily Ned Monterroso MD   500 mg at 11/18/20 2396    sodium hypochlorite (DAKINS) external solution   Irrigation Daily Ned Monterroso MD        sodium chloride flush 0.9 % injection 10 mL  10 mL Intravenous 2 times per day Ned Monterroso MD   10 mL at 11/18/20 0919    sodium chloride flush 0.9 % injection 10 mL  10 mL Intravenous PRN Ned Monterroso MD   10 mL at 11/18/20 0426    ondansetron (ZOFRAN) injection 4 mg  4 mg Intravenous Q6H PRN Ned Monterroso MD        enoxaparin (LOVENOX) injection 40 mg  40 mg Subcutaneous Daily Ned Monterroso MD   40 mg at 11/18/20 0918    oxyCODONE (ROXICODONE) immediate release tablet 5 mg  5 mg Oral Q4H PRN Ned Monterroso MD        Or   [facility] oxyCODONE HCl (OXY-IR) immediate release tablet 10 mg  10 mg Oral Q4H PRN Ned Monterroso MD   10 mg at 11/16/20 7801    acetaminophen (TYLENOL) tablet 650 mg  650 mg Oral Q6H Ned Monterroso MD   650 mg at 11/18/20 1448    buprenorphine-naloxone (SUBOXONE) 8-2 MG SL tablet 1 tablet  1 tablet Sublingual BID Ned Monterroso MD   1 tablet at 11/18/20 0918    piperacillin-tazobactam (ZOSYN) 3.375 g in dextrose 5 % 100 mL IVPB extended infusion (mini-bag)  3.375 g Intravenous Scott Garcia MD 25 mL/hr at 11/18/20 1215 3.375 g at 11/18/20 1215    Post Zosyn Flush (0.9 % sodium chloride infusion)   Intravenous Q8H Aby aBrry MD   Stopped at 11/18/20 1000       REVIEW OF SYSTEMS:    CONSTITUTIONAL:  Denies fever  HEENT: denies blurring of vision or double vision, denies hearing problem  RESPIRATORY: denies cough, shortness of breath, sputum expectoration, chest pain. CARDIOVASCULAR:  Denies palpitation  GASTROINTESTINAL:  Denies abdomen pain, diarrhea or constipation. GENITOURINARY:  Denies burning urination or frequency of urination  INTEGUMENT: Right groin wound   HEMATOLOGIC/LYMPHATIC:  Denies lymph node swelling, gum bleeding  MUSCULOSKELETAL:  Pain in the right groin, scrotum   NEUROLOGICAL:  Weakness     PHYSICAL EXAM:      Vitals:     /66   Pulse 58   Temp 98.2 °F (36.8 °C) (Temporal)   Resp 18   Ht 5' 10\" (1.778 m)   Wt 180 lb (81.6 kg)   SpO2 98%   BMI 25.83 kg/m²     General Appearance:    Awake, alert , no acute distress. Head:    Normocephalic, atraumatic   Eyes:    No pallor, no icterus,   Ears:    No obvious deformity or drainage.    Nose:   No nasal drainage   Throat:   Mucosa moist, no oral thrush   Neck:   Supple, no lymphadenopathy   Back:     no CVA tenderness   Lungs:     Clear to auscultation bilaterally, no wheeze    Heart:    Regular rate and rhythm, no murmur, rub or gallop   Abdomen:     Soft, non-tender, bowel sounds present    Extremities:   No edema, no cyanosis   Right groin wound - packed with guaze dressing    Pulses:   Dorsalis pedis palpable    Skin:   Right thigh wound - dressed         Lab Results   Component Value Date    WBC 19.4 11/18/2020    RBC 3.60 11/18/2020    HGB 10.2 11/18/2020    HCT 31.7 11/18/2020     11/18/2020    MCV 88.1 11/18/2020    MCH 28.3 11/18/2020    MCHC 32.2 11/18/2020    RDW 16.8 11/18/2020    NRBC 0.9 11/12/2020    BLASTSPCT 0.9 11/17/2020    METASPCT 0.9 11/17/2020    LYMPHOPCT 7.0 11/18/2020    MONOPCT 8.7 11/18/2020    MYELOPCT 4.3 11/18/2020    BASOPCT 0.5 11/18/2020    MONOSABS 1.75 11/18/2020    LYMPHSABS 1.36 11/18/2020    EOSABS 0.17 11/18/2020    BASOSABS 0.00 11/18/2020       CMP     Lab Results   Component Value Date     11/18/2020    K 4.8 11/18/2020    K 3.4 11/12/2020     11/18/2020    CO2 29 11/18/2020    BUN 19 11/18/2020    CREATININE 0.8 11/18/2020    GFRAA >60 11/18/2020    LABGLOM >60 11/18/2020    GLUCOSE 157 11/18/2020    PROT 5.7 11/18/2020    LABALBU 2.3 11/18/2020    CALCIUM 8.1 11/18/2020    BILITOT 0.6 11/18/2020    ALKPHOS 121 11/18/2020    AST 21 11/18/2020    ALT 30 11/18/2020         Hepatic Function Panel:    Lab Results   Component Value Date    ALKPHOS 121 11/18/2020    ALT 30 11/18/2020    AST 21 11/18/2020    PROT 5.7 11/18/2020    BILITOT 0.6 11/18/2020    LABALBU 2.3 11/18/2020       PT/INR:    Lab Results   Component Value Date    PROTIME 13.1 11/17/2020    INR 1.2 11/17/2020       TSH:  No results found for: TSH    U/A:  No results found for: NITRITE, COLORU, PHUR, LABCAST, WBCUA, RBCUA, MUCUS, TRICHOMONAS, YEAST, BACTERIA, CLARITYU, SPECGRAV, LEUKOCYTESUR, UROBILINOGEN, BILIRUBINUR, BLOODU, GLUCOSEU, AMORPHOUS    ABG:  No results found for: KOO8YEZ, BEART, P8INNJRE, PHART, THGBART, PCG7BMM, PO2ART, JYO1WDF    MICROBIOLOGY:    Wound Culture -  Abnormal)   Collected: 11/13/20 0136    Order Status: Completed  Specimen: Specimen from Groin  Updated: 11/15/20 0925     Organism  Escherichia coliAbnormal       Culture Surgical  Heavy growth     Organism  Candida albicansAbnormal       Culture Surgical  Light growth    Narrative:              IMPRESSION:     1. Perianal abscess , necrotizing soft tissue infection,  sepsis s/p multiple debridement   2. Leukocytosis, leukemoid reaction - WBC 19 K     RECOMMENDATIONS:     1. Zosyn 3.375 grams IV q 8 hrs   2. Diflucan 400 mg IV to PO  q 24 hrs   3.  Wound care /CBC with diff

## 2020-11-19 LAB
ALBUMIN SERPL-MCNC: 2.4 G/DL (ref 3.5–5.2)
ALP BLD-CCNC: 112 U/L (ref 40–129)
ALT SERPL-CCNC: 55 U/L (ref 0–40)
ANION GAP SERPL CALCULATED.3IONS-SCNC: 9 MMOL/L (ref 7–16)
ANISOCYTOSIS: ABNORMAL
AST SERPL-CCNC: 61 U/L (ref 0–39)
BASOPHILS ABSOLUTE: 0 E9/L (ref 0–0.2)
BASOPHILS RELATIVE PERCENT: 0.9 % (ref 0–2)
BILIRUB SERPL-MCNC: 0.5 MG/DL (ref 0–1.2)
BUN BLDV-MCNC: 20 MG/DL (ref 6–20)
CALCIUM SERPL-MCNC: 8.4 MG/DL (ref 8.6–10.2)
CHLORIDE BLD-SCNC: 103 MMOL/L (ref 98–107)
CO2: 26 MMOL/L (ref 22–29)
CREAT SERPL-MCNC: 0.8 MG/DL (ref 0.7–1.2)
EOSINOPHILS ABSOLUTE: 0 E9/L (ref 0.05–0.5)
EOSINOPHILS RELATIVE PERCENT: 1 % (ref 0–6)
GFR AFRICAN AMERICAN: >60
GFR NON-AFRICAN AMERICAN: >60 ML/MIN/1.73
GLUCOSE BLD-MCNC: 87 MG/DL (ref 74–99)
HCT VFR BLD CALC: 32.5 % (ref 37–54)
HEMOGLOBIN: 10.3 G/DL (ref 12.5–16.5)
LYMPHOCYTES ABSOLUTE: 4.85 E9/L (ref 1.5–4)
LYMPHOCYTES RELATIVE PERCENT: 25.2 % (ref 20–42)
MCH RBC QN AUTO: 28.7 PG (ref 26–35)
MCHC RBC AUTO-ENTMCNC: 31.7 % (ref 32–34.5)
MCV RBC AUTO: 90.5 FL (ref 80–99.9)
METAMYELOCYTES RELATIVE PERCENT: 1.7 % (ref 0–1)
MONOCYTES ABSOLUTE: 1.55 E9/L (ref 0.1–0.95)
MONOCYTES RELATIVE PERCENT: 7.8 % (ref 2–12)
MYELOCYTE PERCENT: 1.7 % (ref 0–0)
NEUTROPHILS ABSOLUTE: 12.8 E9/L (ref 1.8–7.3)
NEUTROPHILS RELATIVE PERCENT: 62.6 % (ref 43–80)
PDW BLD-RTO: 17.4 FL (ref 11.5–15)
PLATELET # BLD: 665 E9/L (ref 130–450)
PMV BLD AUTO: 9.9 FL (ref 7–12)
POTASSIUM SERPL-SCNC: 4.9 MMOL/L (ref 3.5–5)
RBC # BLD: 3.59 E12/L (ref 3.8–5.8)
SODIUM BLD-SCNC: 138 MMOL/L (ref 132–146)
TOTAL PROTEIN: 5.6 G/DL (ref 6.4–8.3)
WBC # BLD: 19.4 E9/L (ref 4.5–11.5)

## 2020-11-19 PROCEDURE — 6360000002 HC RX W HCPCS: Performed by: STUDENT IN AN ORGANIZED HEALTH CARE EDUCATION/TRAINING PROGRAM

## 2020-11-19 PROCEDURE — 85025 COMPLETE CBC W/AUTO DIFF WBC: CPT

## 2020-11-19 PROCEDURE — 36415 COLL VENOUS BLD VENIPUNCTURE: CPT

## 2020-11-19 PROCEDURE — 2580000003 HC RX 258: Performed by: STUDENT IN AN ORGANIZED HEALTH CARE EDUCATION/TRAINING PROGRAM

## 2020-11-19 PROCEDURE — 6370000000 HC RX 637 (ALT 250 FOR IP): Performed by: STUDENT IN AN ORGANIZED HEALTH CARE EDUCATION/TRAINING PROGRAM

## 2020-11-19 PROCEDURE — 99024 POSTOP FOLLOW-UP VISIT: CPT | Performed by: SURGERY

## 2020-11-19 PROCEDURE — 6370000000 HC RX 637 (ALT 250 FOR IP): Performed by: INTERNAL MEDICINE

## 2020-11-19 PROCEDURE — 80053 COMPREHEN METABOLIC PANEL: CPT

## 2020-11-19 PROCEDURE — 1200000000 HC SEMI PRIVATE

## 2020-11-19 RX ORDER — FLUCONAZOLE 100 MG/1
400 TABLET ORAL DAILY
Status: DISCONTINUED | OUTPATIENT
Start: 2020-11-19 | End: 2020-11-22 | Stop reason: HOSPADM

## 2020-11-19 RX ORDER — OXYCODONE HYDROCHLORIDE 5 MG/1
5 TABLET ORAL EVERY 6 HOURS PRN
Qty: 28 TABLET | Refills: 0 | Status: SHIPPED | OUTPATIENT
Start: 2020-11-19 | End: 2020-11-22

## 2020-11-19 RX ADMIN — PIPERACILLIN AND TAZOBACTAM 3.38 G: 3; .375 INJECTION, POWDER, LYOPHILIZED, FOR SOLUTION INTRAVENOUS at 12:00

## 2020-11-19 RX ADMIN — Medication 50 MG: at 09:30

## 2020-11-19 RX ADMIN — OXYCODONE HYDROCHLORIDE AND ACETAMINOPHEN 500 MG: 500 TABLET ORAL at 09:30

## 2020-11-19 RX ADMIN — ACETAMINOPHEN 650 MG: 325 TABLET ORAL at 16:57

## 2020-11-19 RX ADMIN — Medication 10 ML: at 09:30

## 2020-11-19 RX ADMIN — PIPERACILLIN AND TAZOBACTAM 3.38 G: 3; .375 INJECTION, POWDER, LYOPHILIZED, FOR SOLUTION INTRAVENOUS at 04:18

## 2020-11-19 RX ADMIN — SODIUM CHLORIDE: 9 INJECTION, SOLUTION INTRAVENOUS at 01:03

## 2020-11-19 RX ADMIN — ACETAMINOPHEN 650 MG: 325 TABLET ORAL at 09:29

## 2020-11-19 RX ADMIN — FLUCONAZOLE 400 MG: 100 TABLET ORAL at 18:55

## 2020-11-19 RX ADMIN — PIPERACILLIN AND TAZOBACTAM 3.38 G: 3; .375 INJECTION, POWDER, LYOPHILIZED, FOR SOLUTION INTRAVENOUS at 21:05

## 2020-11-19 RX ADMIN — HYOSCYAMINE SULFATE: 16 SOLUTION at 09:30

## 2020-11-19 RX ADMIN — OXYCODONE HYDROCHLORIDE 10 MG: 10 TABLET ORAL at 21:01

## 2020-11-19 RX ADMIN — HYDROMORPHONE HYDROCHLORIDE 0.5 MG: 1 INJECTION, SOLUTION INTRAMUSCULAR; INTRAVENOUS; SUBCUTANEOUS at 23:28

## 2020-11-19 RX ADMIN — ACETAMINOPHEN 650 MG: 325 TABLET ORAL at 21:01

## 2020-11-19 RX ADMIN — BUPRENORPHINE HYDROCHLORIDE AND NALOXONE HYDROCHLORIDE DIHYDRATE 1 TABLET: 8; 2 TABLET SUBLINGUAL at 09:30

## 2020-11-19 RX ADMIN — OXYCODONE HYDROCHLORIDE 10 MG: 10 TABLET ORAL at 09:40

## 2020-11-19 RX ADMIN — BUPRENORPHINE HYDROCHLORIDE AND NALOXONE HYDROCHLORIDE DIHYDRATE 1 TABLET: 8; 2 TABLET SUBLINGUAL at 21:01

## 2020-11-19 RX ADMIN — SODIUM CHLORIDE: 9 INJECTION, SOLUTION INTRAVENOUS at 09:31

## 2020-11-19 RX ADMIN — SODIUM CHLORIDE: 9 INJECTION, SOLUTION INTRAVENOUS at 16:50

## 2020-11-19 RX ADMIN — OXYCODONE HYDROCHLORIDE 10 MG: 10 TABLET ORAL at 05:40

## 2020-11-19 ASSESSMENT — PAIN SCALES - GENERAL
PAINLEVEL_OUTOF10: 7
PAINLEVEL_OUTOF10: 6
PAINLEVEL_OUTOF10: 7
PAINLEVEL_OUTOF10: 5
PAINLEVEL_OUTOF10: 7
PAINLEVEL_OUTOF10: 0
PAINLEVEL_OUTOF10: 7
PAINLEVEL_OUTOF10: 2

## 2020-11-19 ASSESSMENT — PAIN DESCRIPTION - DESCRIPTORS
DESCRIPTORS: ACHING;BURNING;DISCOMFORT

## 2020-11-19 ASSESSMENT — PAIN DESCRIPTION - PAIN TYPE
TYPE: SURGICAL PAIN

## 2020-11-19 ASSESSMENT — PAIN DESCRIPTION - LOCATION
LOCATION: GROIN

## 2020-11-19 NOTE — PROGRESS NOTES
Comprehensive Nutrition Assessment    Type and Reason for Visit:  Initial, RD Nutrition Re-Screen/LOS    Nutrition Recommendations/Plan: Recommend pt continue current diet w/ addition of ONS (Ensure HP/ Alfonzo BID). Nutrition Assessment:  Pt w/ [facility] substance abuse admin for periana abscess, sepsis, NSTI. s/p INCISION AND DRAINAGE RIGHT GROIN/ PERINEUM 11/13. 11/17 s/p  DEBRIDEMENT RIGHT GROIN WOUND    Malnutrition Assessment:  Malnutrition Status: At risk for malnutrition (Comment)    Context:  Acute Illness     Findings of the 6 clinical characteristics of malnutrition:  Energy Intake:  1 - 75% or less of estimated energy requirements for 7 or more days  Weight Loss:  Unable to assess     Body Fat Loss:  Unable to assess     Muscle Mass Loss:  Unable to assess    Fluid Accumulation:  1 - Mild     Strength:  Not Performed    Estimated Daily Nutrient Needs:  Energy (kcal):  2073; kcal; Weight Used for Energy Requirements:  Current     Protein (g):  110-135 g; Weight Used for Protein Requirements:  Ideal(1.5-1.8 g/kg IBW)        Fluid (ml/day):  2100 ml; Method Used for Fluid Requirements:  1 ml/kcal      Nutrition Related Findings:  A/Ox4, constipation, audible bs, no noted edema,+I/O's      Wounds:  Surgical Incision       Current Nutrition Therapies:    DIET GENERAL; Anthropometric Measures:  · Height: 5' 10\" (177.8 cm)  · Current Body Weight: 180 lb (81.6 kg)(11/12)   · Admission Body Weight: (Same as CBW)    · Usual Body Weight:       · Ideal Body Weight: 166 lbs; % Ideal Body Weight 108.4 %   · BMI: 25.8   · BMI Categories: Overweight (BMI 25.0-29. 9)       Nutrition Diagnosis:   · Increased nutrient needs related to increase demand for energy/nutrients as evidenced by wounds      Nutrition Interventions:   Food and/or Nutrient Delivery:  Continue Current Diet, Start Oral Nutrition Supplement  Nutrition Education/Counseling:  No recommendation at this time   Coordination of Nutrition Care: Continue to monitor while inpatient    Goals:  Pt to consume >75% of all meals/ONS       Nutrition Monitoring and Evaluation:   Food/Nutrient Intake Outcomes:  Food and Nutrient Intake, Supplement Intake  Physical Signs/Symptoms Outcomes:  Biochemical Data, Nutrition Focused Physical Findings, Weight, Skin, GI Status, Nausea or Vomiting, Constipation, Fluid Status or Edema, Hemodynamic Status     Discharge Planning:     Too soon to determine     Electronically signed by Daryl Steward RD, JAGRUTI on 11/19/20 at 12:17 PM EST    Contact: [phone]

## 2020-11-19 NOTE — PLAN OF CARE
Problem: Anxiety/Stress:  Goal: Level of anxiety will decrease  Description: Level of anxiety will decrease  Outcome: Met This Shift     Problem: Pain:  Goal: Pain level will decrease  Description: Pain level will decrease  Outcome: Met This Shift

## 2020-11-19 NOTE — PROGRESS NOTES
Department of Internal Medicine  Infectious Diseases  Progress Note    C/C :  Perineal abscess, sepsis , necrotizing soft tissue infection     Pt is awake and alert  Denies fever or chills   Pain controlled     Afebrile       Current Facility-Administered Medications   Medication Dose Route Frequency Provider Last Rate Last Dose    HYDROmorphone (DILAUDID) injection 0.5 mg  0.5 mg Intravenous Q4H PRN Kia Escobedo MD   0.5 mg at 11/18/20 2002    fluconazole (DIFLUCAN) in 0.9 % sodium chloride IVPB 400 mg  400 mg Intravenous Q24H Kia Escobedo  mL/hr at 11/18/20 1717 400 mg at 11/18/20 1717    zinc sulfate (ZINCATE) capsule 50 mg  50 mg Oral Daily Kia Escobedo MD   50 mg at 11/19/20 0930    vitamin C (ASCORBIC ACID) tablet 500 mg  500 mg Oral Daily Kia Escobedo MD   500 mg at 11/19/20 0930    sodium hypochlorite (DAKINS) external solution   Irrigation Daily Kia Escobedo MD        sodium chloride flush 0.9 % injection 10 mL  10 mL Intravenous 2 times per day Kia Escobedo MD   10 mL at 11/19/20 0930    sodium chloride flush 0.9 % injection 10 mL  10 mL Intravenous PRN Kia Escobedo MD   10 mL at 11/18/20 2002    ondansetron (ZOFRAN) injection 4 mg  4 mg Intravenous Q6H PRN Kia Escobedo MD        enoxaparin (LOVENOX) injection 40 mg  40 mg Subcutaneous Daily Kia Escobedo MD   40 mg at 11/18/20 0918    oxyCODONE (ROXICODONE) immediate release tablet 5 mg  5 mg Oral Q4H PRN Kia Escobedo MD        Or   Urena oxyCODONE HCl (OXY-IR) immediate release tablet 10 mg  10 mg Oral Q4H PRN Kia Escobedo MD   10 mg at 11/19/20 0940    acetaminophen (TYLENOL) tablet 650 mg  650 mg Oral Q6H Kia Escobedo MD   650 mg at 11/19/20 0929    buprenorphine-naloxone (SUBOXONE) 8-2 MG SL tablet 1 tablet  1 tablet Sublingual BID Kia Escobedo MD   1 tablet at 11/19/20 0930    piperacillin-tazobactam (ZOSYN) 3.375 g in dextrose 5 % 100 mL IVPB extended infusion (mini-bag) 3.375 g Intravenous Neetu Lora MD   Stopped at 11/19/20 0818    Post Zosyn Flush (0.9 % sodium chloride infusion)   Intravenous Q8H Lenny Baker MD 12.5 mL/hr at 11/19/20 0931         REVIEW OF SYSTEMS:    CONSTITUTIONAL:  Denies fever  HEENT: denies blurring of vision or double vision, denies hearing problem  RESPIRATORY: denies cough, shortness of breath, sputum expectoration, chest pain. CARDIOVASCULAR:  Denies palpitation  GASTROINTESTINAL:  Denies abdomen pain, diarrhea or constipation. GENITOURINARY:  Denies burning urination or frequency of urination  INTEGUMENT: Right groin wound   HEMATOLOGIC/LYMPHATIC:  Denies lymph node swelling, gum bleeding  MUSCULOSKELETAL:  Pain in the right groin, scrotum   NEUROLOGICAL:  Weakness     PHYSICAL EXAM:      Vitals:     /75   Pulse 50   Temp 98 °F (36.7 °C) (Temporal)   Resp 18   Ht 5' 10\" (1.778 m)   Wt 180 lb (81.6 kg)   SpO2 97%   BMI 25.83 kg/m²     General Appearance:    Awake, alert , no acute distress. Head:    Normocephalic, atraumatic   Eyes:    No pallor, no icterus,   Ears:    No obvious deformity or drainage.    Nose:   No nasal drainage   Throat:   Mucosa moist, no oral thrush   Neck:   Supple, no lymphadenopathy   Back:     no CVA tenderness   Lungs:     Clear to auscultation bilaterally, no wheeze    Heart:    Regular rate and rhythm, no murmur, rub or gallop   Abdomen:     Soft, non-tender, bowel sounds present    Extremities:   No edema, no cyanosis   Right groin wound - packed with guaze dressing    Pulses:   Dorsalis pedis palpable    Skin:   Right thigh wound - dressed         Lab Results   Component Value Date    WBC 19.4 11/19/2020    RBC 3.59 11/19/2020    HGB 10.3 11/19/2020    HCT 32.5 11/19/2020     11/19/2020    MCV 90.5 11/19/2020    MCH 28.7 11/19/2020    MCHC 31.7 11/19/2020    RDW 17.4 11/19/2020    NRBC 0.9 11/12/2020    BLASTSPCT 0.9 11/17/2020    METASPCT 1.7 11/19/2020    LYMPHOPCT 25.2 11/19/2020 MONOPCT 7.8 11/19/2020    MYELOPCT 1.7 11/19/2020    BASOPCT 0.9 11/19/2020    MONOSABS 1.55 11/19/2020    LYMPHSABS 4.85 11/19/2020    EOSABS 0.00 11/19/2020    BASOSABS 0.00 11/19/2020       CMP     Lab Results   Component Value Date     11/19/2020    K 4.9 11/19/2020    K 3.4 11/12/2020     11/19/2020    CO2 26 11/19/2020    BUN 20 11/19/2020    CREATININE 0.8 11/19/2020    GFRAA >60 11/19/2020    LABGLOM >60 11/19/2020    GLUCOSE 87 11/19/2020    PROT 5.6 11/19/2020    LABALBU 2.4 11/19/2020    CALCIUM 8.4 11/19/2020    BILITOT 0.5 11/19/2020    ALKPHOS 112 11/19/2020    AST 61 11/19/2020    ALT 55 11/19/2020         Hepatic Function Panel:    Lab Results   Component Value Date    ALKPHOS 112 11/19/2020    ALT 55 11/19/2020    AST 61 11/19/2020    PROT 5.6 11/19/2020    BILITOT 0.5 11/19/2020    LABALBU 2.4 11/19/2020       PT/INR:    Lab Results   Component Value Date    PROTIME 13.1 11/17/2020    INR 1.2 11/17/2020       TSH:  No results found for: TSH    U/A:  No results found for: NITRITE, COLORU, PHUR, LABCAST, WBCUA, RBCUA, MUCUS, TRICHOMONAS, YEAST, BACTERIA, CLARITYU, SPECGRAV, LEUKOCYTESUR, UROBILINOGEN, BILIRUBINUR, BLOODU, GLUCOSEU, AMORPHOUS    ABG:  No results found for: JZX7MDS, BEART, I2CYGGPD, PHART, THGBART, PFD5CXH, PO2ART, VXX8ALX    MICROBIOLOGY:    Wound Culture -  Abnormal)   Collected: 11/13/20 0136    Order Status: Completed  Specimen: Specimen from Groin  Updated: 11/15/20 0925     Organism  Escherichia coliAbnormal       Culture Surgical  Heavy growth     Organism  Candida albicansAbnormal       Culture Surgical  Light growth    Narrative:              IMPRESSION:     1. Perianal abscess , necrotizing soft tissue infection,  sepsis s/p multiple debridement   2. Leukocytosis, leukemoid reaction - WBC 19 K     RECOMMENDATIONS:     1. Zosyn 3.375 grams IV q 8 hrs   2. Diflucan 400 mg  PO  q 24 hrs   3.  Wound care /CBC with diff

## 2020-11-20 LAB
ALBUMIN SERPL-MCNC: 2.4 G/DL (ref 3.5–5.2)
ALP BLD-CCNC: 106 U/L (ref 40–129)
ALT SERPL-CCNC: 43 U/L (ref 0–40)
ANION GAP SERPL CALCULATED.3IONS-SCNC: 4 MMOL/L (ref 7–16)
ANISOCYTOSIS: ABNORMAL
AST SERPL-CCNC: 30 U/L (ref 0–39)
BASOPHILIC STIPPLING: ABNORMAL
BASOPHILS ABSOLUTE: 0 E9/L (ref 0–0.2)
BASOPHILS RELATIVE PERCENT: 0.6 % (ref 0–2)
BILIRUB SERPL-MCNC: 0.6 MG/DL (ref 0–1.2)
BUN BLDV-MCNC: 15 MG/DL (ref 6–20)
CALCIUM SERPL-MCNC: 8.4 MG/DL (ref 8.6–10.2)
CHLORIDE BLD-SCNC: 102 MMOL/L (ref 98–107)
CO2: 33 MMOL/L (ref 22–29)
CREAT SERPL-MCNC: 0.9 MG/DL (ref 0.7–1.2)
EOSINOPHILS ABSOLUTE: 0.21 E9/L (ref 0.05–0.5)
EOSINOPHILS RELATIVE PERCENT: 0.9 % (ref 0–6)
GFR AFRICAN AMERICAN: >60
GFR NON-AFRICAN AMERICAN: >60 ML/MIN/1.73
GLUCOSE BLD-MCNC: 92 MG/DL (ref 74–99)
HCT VFR BLD CALC: 33.6 % (ref 37–54)
HEMOGLOBIN: 10.7 G/DL (ref 12.5–16.5)
LYMPHOCYTES ABSOLUTE: 4.39 E9/L (ref 1.5–4)
LYMPHOCYTES RELATIVE PERCENT: 19.3 % (ref 20–42)
MCH RBC QN AUTO: 28.6 PG (ref 26–35)
MCHC RBC AUTO-ENTMCNC: 31.8 % (ref 32–34.5)
MCV RBC AUTO: 89.8 FL (ref 80–99.9)
METAMYELOCYTES RELATIVE PERCENT: 3.5 % (ref 0–1)
MONOCYTES ABSOLUTE: 0.92 E9/L (ref 0.1–0.95)
MONOCYTES RELATIVE PERCENT: 4.4 % (ref 2–12)
MYELOCYTE PERCENT: 4.4 % (ref 0–0)
NEUTROPHILS ABSOLUTE: 17.33 E9/L (ref 1.8–7.3)
NEUTROPHILS RELATIVE PERCENT: 67.5 % (ref 43–80)
OVALOCYTES: ABNORMAL
PDW BLD-RTO: 17.5 FL (ref 11.5–15)
PLATELET # BLD: 660 E9/L (ref 130–450)
PMV BLD AUTO: 9.6 FL (ref 7–12)
POIKILOCYTES: ABNORMAL
POLYCHROMASIA: ABNORMAL
POTASSIUM SERPL-SCNC: 5 MMOL/L (ref 3.5–5)
RBC # BLD: 3.74 E12/L (ref 3.8–5.8)
SODIUM BLD-SCNC: 139 MMOL/L (ref 132–146)
TOTAL PROTEIN: 5.5 G/DL (ref 6.4–8.3)
WBC # BLD: 23.1 E9/L (ref 4.5–11.5)

## 2020-11-20 PROCEDURE — 6370000000 HC RX 637 (ALT 250 FOR IP): Performed by: STUDENT IN AN ORGANIZED HEALTH CARE EDUCATION/TRAINING PROGRAM

## 2020-11-20 PROCEDURE — 6360000002 HC RX W HCPCS: Performed by: STUDENT IN AN ORGANIZED HEALTH CARE EDUCATION/TRAINING PROGRAM

## 2020-11-20 PROCEDURE — 80053 COMPREHEN METABOLIC PANEL: CPT

## 2020-11-20 PROCEDURE — 6370000000 HC RX 637 (ALT 250 FOR IP): Performed by: INTERNAL MEDICINE

## 2020-11-20 PROCEDURE — 85025 COMPLETE CBC W/AUTO DIFF WBC: CPT

## 2020-11-20 PROCEDURE — 2580000003 HC RX 258: Performed by: STUDENT IN AN ORGANIZED HEALTH CARE EDUCATION/TRAINING PROGRAM

## 2020-11-20 PROCEDURE — 1200000000 HC SEMI PRIVATE

## 2020-11-20 PROCEDURE — 99024 POSTOP FOLLOW-UP VISIT: CPT | Performed by: SURGERY

## 2020-11-20 PROCEDURE — 36415 COLL VENOUS BLD VENIPUNCTURE: CPT

## 2020-11-20 RX ORDER — CIPROFLOXACIN 500 MG/1
500 TABLET, FILM COATED ORAL EVERY 12 HOURS SCHEDULED
Status: DISCONTINUED | OUTPATIENT
Start: 2020-11-20 | End: 2020-11-22 | Stop reason: HOSPADM

## 2020-11-20 RX ORDER — AMOXICILLIN AND CLAVULANATE POTASSIUM 562.5; 437.5; 62.5 MG/1; MG/1; MG/1
2 TABLET, FILM COATED, EXTENDED RELEASE ORAL EVERY 12 HOURS SCHEDULED
Qty: 56 TABLET | Refills: 1 | Status: SHIPPED | OUTPATIENT
Start: 2020-11-20 | End: 2020-12-04

## 2020-11-20 RX ORDER — FLUCONAZOLE 200 MG/1
400 TABLET ORAL DAILY
Qty: 28 TABLET | Refills: 0 | Status: SHIPPED | OUTPATIENT
Start: 2020-11-21 | End: 2020-12-05

## 2020-11-20 RX ORDER — AMOXICILLIN AND CLAVULANATE POTASSIUM 562.5; 437.5; 62.5 MG/1; MG/1; MG/1
2 TABLET, FILM COATED, EXTENDED RELEASE ORAL EVERY 12 HOURS SCHEDULED
Status: DISCONTINUED | OUTPATIENT
Start: 2020-11-20 | End: 2020-11-22 | Stop reason: HOSPADM

## 2020-11-20 RX ORDER — CIPROFLOXACIN 500 MG/1
500 TABLET, FILM COATED ORAL EVERY 12 HOURS SCHEDULED
Qty: 28 TABLET | Refills: 0 | Status: SHIPPED | OUTPATIENT
Start: 2020-11-20 | End: 2020-12-04

## 2020-11-20 RX ADMIN — BUPRENORPHINE HYDROCHLORIDE AND NALOXONE HYDROCHLORIDE DIHYDRATE 1 TABLET: 8; 2 TABLET SUBLINGUAL at 08:34

## 2020-11-20 RX ADMIN — OXYCODONE HYDROCHLORIDE 5 MG: 5 TABLET ORAL at 13:25

## 2020-11-20 RX ADMIN — Medication 50 MG: at 08:34

## 2020-11-20 RX ADMIN — OXYCODONE HYDROCHLORIDE AND ACETAMINOPHEN 500 MG: 500 TABLET ORAL at 08:34

## 2020-11-20 RX ADMIN — CIPROFLOXACIN HYDROCHLORIDE 500 MG: 500 TABLET, FILM COATED ORAL at 21:48

## 2020-11-20 RX ADMIN — ENOXAPARIN SODIUM 40 MG: 40 INJECTION SUBCUTANEOUS at 08:35

## 2020-11-20 RX ADMIN — AMOXICILLIN AND CLAVULANATE POTASSIUM 2 TABLET: 562.5; 437.5; 62.5 TABLET, MULTILAYER, EXTENDED RELEASE ORAL at 21:48

## 2020-11-20 RX ADMIN — HYOSCYAMINE SULFATE: 16 SOLUTION at 08:36

## 2020-11-20 RX ADMIN — HYDROMORPHONE HYDROCHLORIDE 0.5 MG: 1 INJECTION, SOLUTION INTRAMUSCULAR; INTRAVENOUS; SUBCUTANEOUS at 22:59

## 2020-11-20 RX ADMIN — SODIUM CHLORIDE: 9 INJECTION, SOLUTION INTRAVENOUS at 01:37

## 2020-11-20 RX ADMIN — PIPERACILLIN AND TAZOBACTAM 3.38 G: 3; .375 INJECTION, POWDER, LYOPHILIZED, FOR SOLUTION INTRAVENOUS at 12:16

## 2020-11-20 RX ADMIN — ACETAMINOPHEN 650 MG: 325 TABLET ORAL at 08:34

## 2020-11-20 RX ADMIN — PIPERACILLIN AND TAZOBACTAM 3.38 G: 3; .375 INJECTION, POWDER, LYOPHILIZED, FOR SOLUTION INTRAVENOUS at 04:17

## 2020-11-20 RX ADMIN — Medication 10 ML: at 08:37

## 2020-11-20 RX ADMIN — OXYCODONE HYDROCHLORIDE 5 MG: 5 TABLET ORAL at 21:47

## 2020-11-20 RX ADMIN — ACETAMINOPHEN 650 MG: 325 TABLET ORAL at 13:34

## 2020-11-20 RX ADMIN — FLUCONAZOLE 400 MG: 100 TABLET ORAL at 08:34

## 2020-11-20 RX ADMIN — SODIUM CHLORIDE: 9 INJECTION, SOLUTION INTRAVENOUS at 08:26

## 2020-11-20 RX ADMIN — SODIUM CHLORIDE, PRESERVATIVE FREE 10 ML: 5 INJECTION INTRAVENOUS at 12:17

## 2020-11-20 RX ADMIN — Medication 10 ML: at 22:59

## 2020-11-20 ASSESSMENT — PAIN DESCRIPTION - LOCATION
LOCATION: GROIN
LOCATION: GENERALIZED;PERINEUM

## 2020-11-20 ASSESSMENT — PAIN DESCRIPTION - ORIENTATION
ORIENTATION: RIGHT

## 2020-11-20 ASSESSMENT — PAIN SCALES - GENERAL
PAINLEVEL_OUTOF10: 8
PAINLEVEL_OUTOF10: 0
PAINLEVEL_OUTOF10: 2
PAINLEVEL_OUTOF10: 4
PAINLEVEL_OUTOF10: 4
PAINLEVEL_OUTOF10: 0
PAINLEVEL_OUTOF10: 0
PAINLEVEL_OUTOF10: 3
PAINLEVEL_OUTOF10: 0
PAINLEVEL_OUTOF10: 2

## 2020-11-20 ASSESSMENT — PAIN DESCRIPTION - ONSET
ONSET: ON-GOING
ONSET: ON-GOING

## 2020-11-20 ASSESSMENT — PAIN DESCRIPTION - DESCRIPTORS
DESCRIPTORS: ACHING;BURNING;DISCOMFORT
DESCRIPTORS: BURNING;DISCOMFORT;SHOOTING;SHARP

## 2020-11-20 ASSESSMENT — PAIN - FUNCTIONAL ASSESSMENT
PAIN_FUNCTIONAL_ASSESSMENT: PREVENTS OR INTERFERES SOME ACTIVE ACTIVITIES AND ADLS
PAIN_FUNCTIONAL_ASSESSMENT: ACTIVITIES ARE NOT PREVENTED
PAIN_FUNCTIONAL_ASSESSMENT: ACTIVITIES ARE NOT PREVENTED
PAIN_FUNCTIONAL_ASSESSMENT: PREVENTS OR INTERFERES SOME ACTIVE ACTIVITIES AND ADLS

## 2020-11-20 ASSESSMENT — PAIN DESCRIPTION - PROGRESSION
CLINICAL_PROGRESSION: NOT CHANGED
CLINICAL_PROGRESSION: NOT CHANGED

## 2020-11-20 ASSESSMENT — PAIN DESCRIPTION - PAIN TYPE
TYPE: SURGICAL PAIN
TYPE: ACUTE PAIN;SURGICAL PAIN
TYPE: ACUTE PAIN;SURGICAL PAIN
TYPE: SURGICAL PAIN

## 2020-11-20 ASSESSMENT — PAIN DESCRIPTION - FREQUENCY
FREQUENCY: CONTINUOUS
FREQUENCY: CONTINUOUS
FREQUENCY: INTERMITTENT
FREQUENCY: CONTINUOUS

## 2020-11-20 NOTE — PROGRESS NOTES
Department of Internal Medicine  Infectious Diseases  Progress Note    C/C :  Perineal abscess, sepsis , necrotizing soft tissue infection     Pt is awake and alert  Denies fever or chills   Pain controlled     Afebrile       Current Facility-Administered Medications   Medication Dose Route Frequency Provider Last Rate Last Dose    fluconazole (DIFLUCAN) tablet 400 mg  400 mg Oral Daily John Dc MD   400 mg at 11/20/20 0834    HYDROmorphone (DILAUDID) injection 0.5 mg  0.5 mg Intravenous Q4H PRN Yusef Hidalgo MD   0.5 mg at 11/19/20 2328    zinc sulfate (ZINCATE) capsule 50 mg  50 mg Oral Daily Yusef Hidalgo MD   50 mg at 11/20/20 3016    vitamin C (ASCORBIC ACID) tablet 500 mg  500 mg Oral Daily Yusef Hidalgo MD   500 mg at 11/20/20 4818    sodium hypochlorite (DAKINS) external solution   Irrigation Daily Yusef Hidalgo MD        sodium chloride flush 0.9 % injection 10 mL  10 mL Intravenous 2 times per day Yusef Hidalgo MD   10 mL at 11/20/20 0837    sodium chloride flush 0.9 % injection 10 mL  10 mL Intravenous PRN Yusef Hidalgo MD   10 mL at 11/20/20 1217    ondansetron (ZOFRAN) injection 4 mg  4 mg Intravenous Q6H PRN Yusef Hidalgo MD        enoxaparin (LOVENOX) injection 40 mg  40 mg Subcutaneous Daily Yusef Hidalgo MD   40 mg at 11/20/20 0835    oxyCODONE (ROXICODONE) immediate release tablet 5 mg  5 mg Oral Q4H PRN Yusef Hidalgo MD   5 mg at 11/20/20 1325    Or    oxyCODONE HCl (OXY-IR) immediate release tablet 10 mg  10 mg Oral Q4H PRN Yusef Hidalgo MD   10 mg at 11/19/20 2101    acetaminophen (TYLENOL) tablet 650 mg  650 mg Oral Q6H Yusef Hidalgo MD   650 mg at 11/20/20 1334    buprenorphine-naloxone (SUBOXONE) 8-2 MG SL tablet 1 tablet  1 tablet Sublingual BID Yusef Hidalgo MD   1 tablet at 11/20/20 0834    piperacillin-tazobactam (ZOSYN) 3.375 g in dextrose 5 % 100 mL IVPB extended infusion (mini-bag)  3.375 g Intravenous Q8H Fina FIGUEROA Ria Ayala MD 25 mL/hr at 11/20/20 1216 3.375 g at 11/20/20 1216    Post Zosyn Flush (0.9 % sodium chloride infusion)   Intravenous Q8H Jaleesa Wiseman MD   Stopped at 11/20/20 1028       REVIEW OF SYSTEMS:    CONSTITUTIONAL:  Denies fever  HEENT: denies blurring of vision or double vision, denies hearing problem  RESPIRATORY: denies cough, shortness of breath, sputum expectoration, chest pain. CARDIOVASCULAR:  Denies palpitation  GASTROINTESTINAL:  Denies abdomen pain, diarrhea or constipation. GENITOURINARY:  Denies burning urination or frequency of urination  INTEGUMENT: Right groin wound   HEMATOLOGIC/LYMPHATIC:  Denies lymph node swelling, gum bleeding  MUSCULOSKELETAL:  Pain in the right groin, scrotum   NEUROLOGICAL:  Weakness     PHYSICAL EXAM:      Vitals:     /60   Pulse 59   Temp 97.5 °F (36.4 °C) (Temporal)   Resp 14   Ht 5' 10\" (1.778 m)   Wt 193 lb 4.8 oz (87.7 kg)   SpO2 95%   BMI 27.74 kg/m²     General Appearance:    Awake, alert , no acute distress. Head:    Normocephalic, atraumatic   Eyes:    No pallor, no icterus,   Ears:    No obvious deformity or drainage.    Nose:   No nasal drainage   Throat:   Mucosa moist, no oral thrush   Neck:   Supple, no lymphadenopathy   Back:     no CVA tenderness   Lungs:     Clear to auscultation bilaterally, no wheeze    Heart:    Regular rate and rhythm, no murmur, rub or gallop   Abdomen:     Soft, non-tender, bowel sounds present    Extremities:   No edema, no cyanosis   Right groin wound - packed with guaze dressing    Pulses:   Dorsalis pedis palpable    Skin:   Right thigh wound - dressed         Lab Results   Component Value Date    WBC 23.1 11/20/2020    RBC 3.74 11/20/2020    HGB 10.7 11/20/2020    HCT 33.6 11/20/2020     11/20/2020    MCV 89.8 11/20/2020    MCH 28.6 11/20/2020    MCHC 31.8 11/20/2020    RDW 17.5 11/20/2020    NRBC 0.9 11/12/2020    BLASTSPCT 0.9 11/17/2020    METASPCT 3.5 11/20/2020    LYMPHOPCT 19.3 11/20/2020 Anaerobic Culture  --     Heavy growth   Beta Lactamase negative   A negative Beta Lactamase test does not necessarily assure   susceptibility to this drug. Organism  Anaerobic gram positive cocciAbnormal       Anaerobic Culture  --     Light growth   Beta Lactamase negative   A negative Beta Lactamase test does not necessarily assure   susceptibility to this drug. Narrative:              IMPRESSION:     1. Perianal abscess , necrotizing soft tissue infection,  sepsis s/p multiple debridement   2. Leukocytosis, leukemoid reaction - WBC 23 K     RECOMMENDATIONS:     1. Stop zosyn   2. Oral Augmentin  2 grams po q 12 hrs/ Ciprofloxacin 500 mg po q 12 hrs  /  Diflucan 400 mg  PO  q 24 hrs X 2 weeks   3.  Wound care /CBC with diff

## 2020-11-20 NOTE — PROGRESS NOTES
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 - ABX per ID, appreciate input   - local wound care,   -  for [place] [address] and d/c planning,   - given hx of UC will make referral to GI     VTE Prophylaxis: scdmanasa donovan.      Guillermo Molina MD FACS     11:17 AM

## 2020-11-20 NOTE — CARE COORDINATION
11/20/2020 social work transition of care planning  Pt plan remains for home. Sw unable to secure a hhc agency in network with [employer]. Awaiting Id final plan. If iv atb are q 24,pt will have to go to infusion center ('[facility]). Sw updated both pt and significant other of issue with hhc.  Electronically signed by SEGUNDO Byrne on 11/20/2020 at 7:58 AM      Addendum:Sw received call from Pt's wife, [facility] c for [address] pts [phone]. Sw made referral and provided requested info,will await response.   Electronically signed by SEGUNDO Byrne on 11/20/2020 at 10:29 AM

## 2020-11-20 NOTE — PROGRESS NOTES
PT wound dressing changed and repacked per orders. PT tolerated well. PT declined shower his evening .  Stated it was too late and he just wanted the dressing changed until tomorrow

## 2020-11-21 LAB
ALBUMIN SERPL-MCNC: 2.8 G/DL (ref 3.5–5.2)
ALP BLD-CCNC: 110 U/L (ref 40–129)
ALT SERPL-CCNC: 33 U/L (ref 0–40)
ANION GAP SERPL CALCULATED.3IONS-SCNC: 11 MMOL/L (ref 7–16)
AST SERPL-CCNC: 20 U/L (ref 0–39)
BASOPHILS ABSOLUTE: 0.2 E9/L (ref 0–0.2)
BASOPHILS RELATIVE PERCENT: 0.9 % (ref 0–2)
BILIRUB SERPL-MCNC: 0.7 MG/DL (ref 0–1.2)
BUN BLDV-MCNC: 12 MG/DL (ref 6–20)
CALCIUM SERPL-MCNC: 8.8 MG/DL (ref 8.6–10.2)
CHLORIDE BLD-SCNC: 101 MMOL/L (ref 98–107)
CO2: 26 MMOL/L (ref 22–29)
CREAT SERPL-MCNC: 0.9 MG/DL (ref 0.7–1.2)
EOSINOPHILS ABSOLUTE: 0 E9/L (ref 0.05–0.5)
EOSINOPHILS RELATIVE PERCENT: 1 % (ref 0–6)
GFR AFRICAN AMERICAN: >60
GFR NON-AFRICAN AMERICAN: >60 ML/MIN/1.73
GLUCOSE BLD-MCNC: 98 MG/DL (ref 74–99)
HCT VFR BLD CALC: 31.8 % (ref 37–54)
HEMOGLOBIN: 10.3 G/DL (ref 12.5–16.5)
HYPOCHROMIA: ABNORMAL
LYMPHOCYTES ABSOLUTE: 2.6 E9/L (ref 1.5–4)
LYMPHOCYTES RELATIVE PERCENT: 12.2 % (ref 20–42)
MCH RBC QN AUTO: 28.4 PG (ref 26–35)
MCHC RBC AUTO-ENTMCNC: 32.4 % (ref 32–34.5)
MCV RBC AUTO: 87.6 FL (ref 80–99.9)
MONOCYTES ABSOLUTE: 0.87 E9/L (ref 0.1–0.95)
MONOCYTES RELATIVE PERCENT: 4.3 % (ref 2–12)
MYELOCYTE PERCENT: 1.7 % (ref 0–0)
NEUTROPHILS ABSOLUTE: 18.01 E9/L (ref 1.8–7.3)
NEUTROPHILS RELATIVE PERCENT: 80.9 % (ref 43–80)
PDW BLD-RTO: 17.4 FL (ref 11.5–15)
PLATELET # BLD: 573 E9/L (ref 130–450)
PMV BLD AUTO: 9.5 FL (ref 7–12)
POLYCHROMASIA: ABNORMAL
POTASSIUM SERPL-SCNC: 4.7 MMOL/L (ref 3.5–5)
RBC # BLD: 3.63 E12/L (ref 3.8–5.8)
SODIUM BLD-SCNC: 138 MMOL/L (ref 132–146)
TOTAL PROTEIN: 6 G/DL (ref 6.4–8.3)
WBC # BLD: 21.7 E9/L (ref 4.5–11.5)

## 2020-11-21 PROCEDURE — 6370000000 HC RX 637 (ALT 250 FOR IP): Performed by: STUDENT IN AN ORGANIZED HEALTH CARE EDUCATION/TRAINING PROGRAM

## 2020-11-21 PROCEDURE — 85025 COMPLETE CBC W/AUTO DIFF WBC: CPT

## 2020-11-21 PROCEDURE — 6360000002 HC RX W HCPCS: Performed by: STUDENT IN AN ORGANIZED HEALTH CARE EDUCATION/TRAINING PROGRAM

## 2020-11-21 PROCEDURE — 99024 POSTOP FOLLOW-UP VISIT: CPT | Performed by: SURGERY

## 2020-11-21 PROCEDURE — 2580000003 HC RX 258: Performed by: STUDENT IN AN ORGANIZED HEALTH CARE EDUCATION/TRAINING PROGRAM

## 2020-11-21 PROCEDURE — 80053 COMPREHEN METABOLIC PANEL: CPT

## 2020-11-21 PROCEDURE — 1200000000 HC SEMI PRIVATE

## 2020-11-21 PROCEDURE — 36415 COLL VENOUS BLD VENIPUNCTURE: CPT

## 2020-11-21 PROCEDURE — 6370000000 HC RX 637 (ALT 250 FOR IP): Performed by: INTERNAL MEDICINE

## 2020-11-21 RX ADMIN — ACETAMINOPHEN 650 MG: 325 TABLET ORAL at 02:59

## 2020-11-21 RX ADMIN — ACETAMINOPHEN 650 MG: 325 TABLET ORAL at 14:04

## 2020-11-21 RX ADMIN — Medication 10 ML: at 09:55

## 2020-11-21 RX ADMIN — OXYCODONE HYDROCHLORIDE 5 MG: 5 TABLET ORAL at 20:50

## 2020-11-21 RX ADMIN — CIPROFLOXACIN HYDROCHLORIDE 500 MG: 500 TABLET, FILM COATED ORAL at 09:55

## 2020-11-21 RX ADMIN — FLUCONAZOLE 400 MG: 100 TABLET ORAL at 09:52

## 2020-11-21 RX ADMIN — ENOXAPARIN SODIUM 40 MG: 40 INJECTION SUBCUTANEOUS at 09:52

## 2020-11-21 RX ADMIN — AMOXICILLIN AND CLAVULANATE POTASSIUM 2 TABLET: 562.5; 437.5; 62.5 TABLET, MULTILAYER, EXTENDED RELEASE ORAL at 09:51

## 2020-11-21 RX ADMIN — AMOXICILLIN AND CLAVULANATE POTASSIUM 2 TABLET: 562.5; 437.5; 62.5 TABLET, MULTILAYER, EXTENDED RELEASE ORAL at 20:49

## 2020-11-21 RX ADMIN — BUPRENORPHINE HYDROCHLORIDE AND NALOXONE HYDROCHLORIDE DIHYDRATE 1 TABLET: 8; 2 TABLET SUBLINGUAL at 09:54

## 2020-11-21 RX ADMIN — BUPRENORPHINE HYDROCHLORIDE AND NALOXONE HYDROCHLORIDE DIHYDRATE 1 TABLET: 8; 2 TABLET SUBLINGUAL at 22:36

## 2020-11-21 RX ADMIN — OXYCODONE HYDROCHLORIDE 5 MG: 5 TABLET ORAL at 07:18

## 2020-11-21 RX ADMIN — ACETAMINOPHEN 650 MG: 325 TABLET ORAL at 20:49

## 2020-11-21 RX ADMIN — CIPROFLOXACIN HYDROCHLORIDE 500 MG: 500 TABLET, FILM COATED ORAL at 20:49

## 2020-11-21 RX ADMIN — OXYCODONE HYDROCHLORIDE AND ACETAMINOPHEN 500 MG: 500 TABLET ORAL at 09:53

## 2020-11-21 RX ADMIN — ACETAMINOPHEN 650 MG: 325 TABLET ORAL at 09:54

## 2020-11-21 RX ADMIN — Medication 50 MG: at 09:53

## 2020-11-21 RX ADMIN — HYOSCYAMINE SULFATE: 16 SOLUTION at 12:41

## 2020-11-21 ASSESSMENT — PAIN SCALES - GENERAL
PAINLEVEL_OUTOF10: 4
PAINLEVEL_OUTOF10: 3
PAINLEVEL_OUTOF10: 6
PAINLEVEL_OUTOF10: 2
PAINLEVEL_OUTOF10: 0
PAINLEVEL_OUTOF10: 3
PAINLEVEL_OUTOF10: 3
PAINLEVEL_OUTOF10: 6

## 2020-11-21 NOTE — PROGRESS NOTES
Department of Internal Medicine  Infectious Diseases  Progress Note    C/C :  Perineal abscess, sepsis , necrotizing soft tissue infection     Pt is awake and alert  Denies fever or chills, abdominal pain, diarrhea  Pain controlled     Afebrile       Current Facility-Administered Medications   Medication Dose Route Frequency Provider Last Rate Last Dose    amoxicillin-clavulanate (AUGMENTIN XR) 1000-62.5 MG per extended release tablet 2 tablet  2 tablet Oral 2 times per day Kenya Mukherjee MD   2 tablet at 11/21/20 0951    ciprofloxacin (CIPRO) tablet 500 mg  500 mg Oral 2 times per day Kenya Mukherjee MD   500 mg at 11/21/20 0955    fluconazole (DIFLUCAN) tablet 400 mg  400 mg Oral Daily John Dc MD   400 mg at 11/21/20 8918    HYDROmorphone (DILAUDID) injection 0.5 mg  0.5 mg Intravenous Q4H PRN Dionisio Barraza MD   0.5 mg at 11/20/20 2259    zinc sulfate (ZINCATE) capsule 50 mg  50 mg Oral Daily Dionisio Barraza MD   50 mg at 11/21/20 3397    vitamin C (ASCORBIC ACID) tablet 500 mg  500 mg Oral Daily Dionisio Barraza MD   500 mg at 11/21/20 1834    sodium hypochlorite (DAKINS) external solution   Irrigation Daily Dionisio Barraza MD        sodium chloride flush 0.9 % injection 10 mL  10 mL Intravenous 2 times per day Dionisio Barraza MD   10 mL at 11/21/20 0955    sodium chloride flush 0.9 % injection 10 mL  10 mL Intravenous PRN Dionisio Barraza MD   10 mL at 11/20/20 1217    ondansetron (ZOFRAN) injection 4 mg  4 mg Intravenous Q6H PRN Dionisio Barraza MD        enoxaparin (LOVENOX) injection 40 mg  40 mg Subcutaneous Daily Dionisio Barraza MD   40 mg at 11/21/20 8633    oxyCODONE (ROXICODONE) immediate release tablet 5 mg  5 mg Oral Q4H PRN Dionisio Barraza MD   5 mg at 11/21/20 9893    Or    oxyCODONE HCl (OXY-IR) immediate release tablet 10 mg  10 mg Oral Q4H PRN Dionisio Barraza MD   10 mg at 11/19/20 2101    acetaminophen (TYLENOL) tablet 650 mg  650 mg Oral Q6H Fina A Lizabeth Severs, MD   650 mg at 11/21/20 0954    buprenorphine-naloxone (SUBOXONE) 8-2 MG SL tablet 1 tablet  1 tablet Sublingual BID Nito Casillas MD   1 tablet at 11/21/20 9670       REVIEW OF SYSTEMS:    CONSTITUTIONAL:  Denies fever  HEENT: denies blurring of vision or double vision, denies hearing problem  RESPIRATORY: denies cough, shortness of breath, sputum expectoration, chest pain. CARDIOVASCULAR:  Denies palpitation  GASTROINTESTINAL:  Denies abdomen pain, diarrhea or constipation. GENITOURINARY:  Denies burning urination or frequency of urination  INTEGUMENT: Right groin wound   HEMATOLOGIC/LYMPHATIC:  Denies lymph node swelling, gum bleeding  MUSCULOSKELETAL:  Pain in the right groin, scrotum   NEUROLOGICAL:  Weakness     PHYSICAL EXAM:      Vitals:     BP (!) 106/57   Pulse 79   Temp 99.8 °F (37.7 °C) (Temporal)   Resp 16   Ht 5' 10\" (1.778 m)   Wt 193 lb 4.8 oz (87.7 kg)   SpO2 93%   BMI 27.74 kg/m²     General Appearance:    Awake, alert , no acute distress. Head:    Normocephalic, atraumatic   Eyes:    No pallor, no icterus,   Ears:    No obvious deformity or drainage.    Nose:   No nasal drainage   Throat:   Mucosa moist, no oral thrush   Neck:   Supple, no lymphadenopathy   Back:     no CVA tenderness   Lungs:     Clear to auscultation bilaterally, no wheeze    Heart:    Regular rate and rhythm, no murmur, rub or gallop   Abdomen:     Soft, non-tender, bowel sounds present    Extremities:   No edema, no cyanosis   Right groin wound - packed with guaze dressing    Pulses:   Dorsalis pedis palpable    Skin:   Right thigh wound - dressed         Lab Results   Component Value Date    WBC 21.7 11/21/2020    RBC 3.63 11/21/2020    HGB 10.3 11/21/2020    HCT 31.8 11/21/2020     11/21/2020    MCV 87.6 11/21/2020    MCH 28.4 11/21/2020    MCHC 32.4 11/21/2020    RDW 17.4 11/21/2020    NRBC 0.9 11/12/2020    BLASTSPCT 0.9 11/17/2020    METASPCT 3.5 11/20/2020    LYMPHOPCT 12.2 11/21/2020 Anaerobic Culture  --     Heavy growth   Beta Lactamase negative   A negative Beta Lactamase test does not necessarily assure   susceptibility to this drug. Organism  Anaerobic gram positive cocciAbnormal       Anaerobic Culture  --     Light growth   Beta Lactamase negative   A negative Beta Lactamase test does not necessarily assure   susceptibility to this drug. Narrative:              IMPRESSION:     1. Perianal abscess , necrotizing soft tissue infection,  sepsis s/p multiple debridement   2. Leukocytosis, leukemoid reaction - WBC 23 K     RECOMMENDATIONS:     1. Oral Augmentin  2 grams po q 12 hrs/ Ciprofloxacin 500 mg po q 12 hrs  /  Diflucan 400 mg  PO  q 24 hrs X 2 weeks until 12/04  2. Wound care   3.  CBC with diff

## 2020-11-21 NOTE — PROGRESS NOTES
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 - ABX per ID, appreciate input   - local wound care,   [facility],   - given hx of UC will make referral to GI     - home when has wound care figured out. ..     VTE Prophylaxis: scds, lovenox.      Madalyn Ramirez MD FACS     12:01 PM

## 2020-11-22 VITALS
SYSTOLIC BLOOD PRESSURE: 108 MMHG | WEIGHT: 184.44 LBS | HEIGHT: 70 IN | HEART RATE: 78 BPM | DIASTOLIC BLOOD PRESSURE: 62 MMHG | RESPIRATION RATE: 16 BRPM | OXYGEN SATURATION: 97 % | TEMPERATURE: 98.2 F | BODY MASS INDEX: 26.4 KG/M2

## 2020-11-22 LAB
ALBUMIN SERPL-MCNC: 2.8 G/DL (ref 3.5–5.2)
ALP BLD-CCNC: 117 U/L (ref 40–129)
ALT SERPL-CCNC: 30 U/L (ref 0–40)
ANION GAP SERPL CALCULATED.3IONS-SCNC: 13 MMOL/L (ref 7–16)
AST SERPL-CCNC: 22 U/L (ref 0–39)
BASOPHILS ABSOLUTE: 0.31 E9/L (ref 0–0.2)
BASOPHILS RELATIVE PERCENT: 2.6 % (ref 0–2)
BILIRUB SERPL-MCNC: 0.5 MG/DL (ref 0–1.2)
BUN BLDV-MCNC: 15 MG/DL (ref 6–20)
BURR CELLS: ABNORMAL
CALCIUM SERPL-MCNC: 9.1 MG/DL (ref 8.6–10.2)
CHLORIDE BLD-SCNC: 102 MMOL/L (ref 98–107)
CO2: 24 MMOL/L (ref 22–29)
CREAT SERPL-MCNC: 0.9 MG/DL (ref 0.7–1.2)
EOSINOPHILS ABSOLUTE: 0.31 E9/L (ref 0.05–0.5)
EOSINOPHILS RELATIVE PERCENT: 2.6 % (ref 0–6)
GFR AFRICAN AMERICAN: >60
GFR NON-AFRICAN AMERICAN: >60 ML/MIN/1.73
GLUCOSE BLD-MCNC: 96 MG/DL (ref 74–99)
HCT VFR BLD CALC: 33.6 % (ref 37–54)
HEMOGLOBIN: 10.6 G/DL (ref 12.5–16.5)
LYMPHOCYTES ABSOLUTE: 1.92 E9/L (ref 1.5–4)
LYMPHOCYTES RELATIVE PERCENT: 15.8 % (ref 20–42)
MCH RBC QN AUTO: 28.5 PG (ref 26–35)
MCHC RBC AUTO-ENTMCNC: 31.5 % (ref 32–34.5)
MCV RBC AUTO: 90.3 FL (ref 80–99.9)
METAMYELOCYTES RELATIVE PERCENT: 0.9 % (ref 0–1)
MONOCYTES ABSOLUTE: 0.72 E9/L (ref 0.1–0.95)
MONOCYTES RELATIVE PERCENT: 6.1 % (ref 2–12)
MYELOCYTE PERCENT: 0.9 % (ref 0–0)
NEUTROPHILS ABSOLUTE: 8.76 E9/L (ref 1.8–7.3)
NEUTROPHILS RELATIVE PERCENT: 71.1 % (ref 43–80)
PDW BLD-RTO: 17.7 FL (ref 11.5–15)
PLATELET # BLD: 598 E9/L (ref 130–450)
PMV BLD AUTO: 9.9 FL (ref 7–12)
POIKILOCYTES: ABNORMAL
POLYCHROMASIA: ABNORMAL
POTASSIUM SERPL-SCNC: 4.8 MMOL/L (ref 3.5–5)
RBC # BLD: 3.72 E12/L (ref 3.8–5.8)
SODIUM BLD-SCNC: 139 MMOL/L (ref 132–146)
TOTAL PROTEIN: 6.4 G/DL (ref 6.4–8.3)
WBC # BLD: 12 E9/L (ref 4.5–11.5)

## 2020-11-22 PROCEDURE — 80053 COMPREHEN METABOLIC PANEL: CPT

## 2020-11-22 PROCEDURE — 99024 POSTOP FOLLOW-UP VISIT: CPT | Performed by: SURGERY

## 2020-11-22 PROCEDURE — 36415 COLL VENOUS BLD VENIPUNCTURE: CPT

## 2020-11-22 PROCEDURE — 2580000003 HC RX 258: Performed by: STUDENT IN AN ORGANIZED HEALTH CARE EDUCATION/TRAINING PROGRAM

## 2020-11-22 PROCEDURE — 6370000000 HC RX 637 (ALT 250 FOR IP): Performed by: STUDENT IN AN ORGANIZED HEALTH CARE EDUCATION/TRAINING PROGRAM

## 2020-11-22 PROCEDURE — 6360000002 HC RX W HCPCS: Performed by: STUDENT IN AN ORGANIZED HEALTH CARE EDUCATION/TRAINING PROGRAM

## 2020-11-22 PROCEDURE — 6370000000 HC RX 637 (ALT 250 FOR IP): Performed by: INTERNAL MEDICINE

## 2020-11-22 PROCEDURE — 85025 COMPLETE CBC W/AUTO DIFF WBC: CPT

## 2020-11-22 RX ORDER — OXYCODONE HYDROCHLORIDE 5 MG/1
5 TABLET ORAL EVERY 6 HOURS PRN
Qty: 28 TABLET | Refills: 0 | Status: SHIPPED | OUTPATIENT
Start: 2020-11-22 | End: 2020-11-29

## 2020-11-22 RX ADMIN — AMOXICILLIN AND CLAVULANATE POTASSIUM 2 TABLET: 562.5; 437.5; 62.5 TABLET, MULTILAYER, EXTENDED RELEASE ORAL at 09:39

## 2020-11-22 RX ADMIN — OXYCODONE HYDROCHLORIDE AND ACETAMINOPHEN 500 MG: 500 TABLET ORAL at 09:39

## 2020-11-22 RX ADMIN — CIPROFLOXACIN HYDROCHLORIDE 500 MG: 500 TABLET, FILM COATED ORAL at 09:40

## 2020-11-22 RX ADMIN — FLUCONAZOLE 400 MG: 100 TABLET ORAL at 09:39

## 2020-11-22 RX ADMIN — OXYCODONE HYDROCHLORIDE 10 MG: 10 TABLET ORAL at 14:17

## 2020-11-22 RX ADMIN — ENOXAPARIN SODIUM 40 MG: 40 INJECTION SUBCUTANEOUS at 09:42

## 2020-11-22 RX ADMIN — Medication 50 MG: at 09:40

## 2020-11-22 RX ADMIN — BUPRENORPHINE HYDROCHLORIDE AND NALOXONE HYDROCHLORIDE DIHYDRATE 1 TABLET: 8; 2 TABLET SUBLINGUAL at 09:40

## 2020-11-22 RX ADMIN — ACETAMINOPHEN 650 MG: 325 TABLET ORAL at 09:40

## 2020-11-22 RX ADMIN — OXYCODONE HYDROCHLORIDE 5 MG: 5 TABLET ORAL at 06:44

## 2020-11-22 RX ADMIN — Medication 10 ML: at 09:47

## 2020-11-22 ASSESSMENT — PAIN DESCRIPTION - DESCRIPTORS
DESCRIPTORS: BURNING;DISCOMFORT;SHARP
DESCRIPTORS: BURNING;DISCOMFORT;SHARP
DESCRIPTORS: BURNING;DISCOMFORT

## 2020-11-22 ASSESSMENT — PAIN DESCRIPTION - PAIN TYPE
TYPE: SURGICAL PAIN

## 2020-11-22 ASSESSMENT — PAIN SCALES - GENERAL
PAINLEVEL_OUTOF10: 3
PAINLEVEL_OUTOF10: 8
PAINLEVEL_OUTOF10: 9
PAINLEVEL_OUTOF10: 5
PAINLEVEL_OUTOF10: 3
PAINLEVEL_OUTOF10: 6
PAINLEVEL_OUTOF10: 3

## 2020-11-22 ASSESSMENT — PAIN DESCRIPTION - LOCATION
LOCATION: GROIN

## 2020-11-22 ASSESSMENT — PAIN DESCRIPTION - ORIENTATION
ORIENTATION: RIGHT

## 2020-11-22 NOTE — PROGRESS NOTES
Patient showered, removed dressing and washed with soapy water. Pt returned to room and wife was taught how to repack wound with saline soaked kerlix and apply dry dressing to wound. Pt's wife Kofi Combs completed the dressing change and did a good job. General surgery was notified that wife was able to do dressing change.

## 2020-11-22 NOTE — PROGRESS NOTES
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 - ABX per ID, appreciate input   - local wound care,   -  for [place] [address] and d/c planning,   - given hx of UC will make referral to GI     - home when has wound care figured out. .. maybe today. .     VTE Prophylaxis: scds, lovenox.      Luli Dubon MD FACS     10:38 AM

## 2020-11-22 NOTE — PROGRESS NOTES
6.1 11/22/2020    MYELOPCT 0.9 11/22/2020    BASOPCT 2.6 11/22/2020    MONOSABS 0.72 11/22/2020    LYMPHSABS 1.92 11/22/2020    EOSABS 0.31 11/22/2020    BASOSABS 0.31 11/22/2020       CMP     Lab Results   Component Value Date     11/22/2020    K 4.8 11/22/2020    K 3.4 11/12/2020     11/22/2020    CO2 24 11/22/2020    BUN 15 11/22/2020    CREATININE 0.9 11/22/2020    GFRAA >60 11/22/2020    LABGLOM >60 11/22/2020    GLUCOSE 96 11/22/2020    PROT 6.4 11/22/2020    LABALBU 2.8 11/22/2020    CALCIUM 9.1 11/22/2020    BILITOT 0.5 11/22/2020    ALKPHOS 117 11/22/2020    AST 22 11/22/2020    ALT 30 11/22/2020         Hepatic Function Panel:    Lab Results   Component Value Date    ALKPHOS 117 11/22/2020    ALT 30 11/22/2020    AST 22 11/22/2020    PROT 6.4 11/22/2020    BILITOT 0.5 11/22/2020    LABALBU 2.8 11/22/2020       PT/INR:    Lab Results   Component Value Date    PROTIME 13.1 11/17/2020    INR 1.2 11/17/2020       TSH:  No results found for: TSH    U/A:  No results found for: NITRITE, COLORU, PHUR, LABCAST, WBCUA, RBCUA, MUCUS, TRICHOMONAS, YEAST, BACTERIA, CLARITYU, SPECGRAV, LEUKOCYTESUR, UROBILINOGEN, BILIRUBINUR, BLOODU, GLUCOSEU, AMORPHOUS    ABG:  No results found for: ITN5HAS, BEART, M6UHZFGZ, PHART, THGBART, IGW0ZKL, PO2ART, YAK1HYV    MICROBIOLOGY:    Wound Culture -  Abnormal)   Collected: 11/13/20 0136    Order Status: Completed  Specimen: Specimen from Groin  Updated: 11/15/20 0925     Organism  Escherichia coliAbnormal       Culture Surgical  Heavy growth     Organism  Candida albicansAbnormal       Culture Surgical  Light growth    Narrative:        Culture, Anaerobic [[id number]]  (Abnormal)  Collected: 11/13/20 0136    Order Status: Completed  Specimen: Specimen from Groin  Updated: 11/17/20 1329     Organism  Anaerobic gram negative rodAbnormal       Anaerobic Culture  --     Heavy growth   Beta Lactamase POSITIVE     Organism  Anaerobic gram negative rodAbnormal       Anaerobic Culture  --     Heavy growth   Beta Lactamase negative   A negative Beta Lactamase test does not necessarily assure   susceptibility to this drug. Organism  Anaerobic gram positive cocciAbnormal       Anaerobic Culture  --     Light growth   Beta Lactamase negative   A negative Beta Lactamase test does not necessarily assure   susceptibility to this drug. Narrative:              IMPRESSION:     1. Perianal abscess , necrotizing soft tissue infection,  sepsis s/p multiple debridement   2. Leukocytosis, decreasing - WBC 12 K from 23 K    RECOMMENDATIONS:     1. Oral Augmentin  2 grams po q 12 hrs/ Ciprofloxacin 500 mg po q 12 hrs  /  Diflucan 400 mg  PO  q 24 hrs X 2 weeks until 12/04  2. Wound care   3.  CBC with diff

## 2020-11-24 ENCOUNTER — TELEPHONE (OUTPATIENT)
Dept: SURGERY | Age: 40
End: 2020-11-24

## 2020-11-25 ENCOUNTER — TELEPHONE (OUTPATIENT)
Dept: SURGERY | Age: 40
End: 2020-11-25

## 2020-11-25 NOTE — TELEPHONE ENCOUNTER
Pt wife called to find out status update for appointment. MA reviewed previous notes per last note pt should follow up with dr Chrissy Jack.  MA gave pt wife [facility] number  Electronically signed by Burt Champion MA on 11/25/20 at 1:38 PM EST

## 2020-12-03 ENCOUNTER — TELEPHONE (OUTPATIENT)
Dept: SURGERY | Age: 40
End: 2020-12-03

## 2020-12-03 RX ORDER — MAG HYDROX/ALUMINUM HYD/SIMETH 400-400-40
20 SUSPENSION, ORAL (FINAL DOSE FORM) ORAL DAILY
Qty: 210 ML | Refills: 5 | Status: SHIPPED | OUTPATIENT
Start: 2020-12-03

## 2020-12-03 NOTE — TELEPHONE ENCOUNTER
MA received a VM from patients wife that she is out of sterile water for patient dressing changes. Taya Timoute stated that in order for her to get it needs an RX sent to [facility] in [place]. MA called [facility] and verified they carried it and they do and she does need an RX to get it. Pt P/O appt is 12/7/2020. MA routing message to  for further assistance if wants send RX for sterile water or if other recommendation. Pt does not have home care patients wife takes care of all patients wound care.      Taya Longoria can be reached at [phone]    Electronically signed by Kaveh Kaufman MA on 12/3/20 at 1:28 PM EST

## 2020-12-03 NOTE — TELEPHONE ENCOUNTER
MA contacted patient wife and notified of the RX sent to pharmacy.       Electronically signed by Mohit Cruz MA on 12/3/20 at 1:42 PM EST

## 2020-12-07 ENCOUNTER — OFFICE VISIT (OUTPATIENT)
Dept: SURGERY | Age: 40
End: 2020-12-07
Payer: COMMERCIAL

## 2020-12-07 VITALS
WEIGHT: 177 LBS | HEART RATE: 79 BPM | BODY MASS INDEX: 24.78 KG/M2 | OXYGEN SATURATION: 100 % | SYSTOLIC BLOOD PRESSURE: 121 MMHG | TEMPERATURE: 95.1 F | DIASTOLIC BLOOD PRESSURE: 76 MMHG | HEIGHT: 71 IN | RESPIRATION RATE: 15 BRPM

## 2020-12-07 PROCEDURE — 99212 OFFICE O/P EST SF 10 MIN: CPT | Performed by: SURGERY

## 2020-12-07 PROCEDURE — 99024 POSTOP FOLLOW-UP VISIT: CPT | Performed by: SURGERY

## 2020-12-07 NOTE — PROGRESS NOTES
[facility]  PROGRESS NOTE  ATTENDING NOTE    Chief Complaint   Patient presents with    Post-Op Check     P/O I&D groin. Pt states he has finished his antibiotics. Patient has concerns about being off the antibiotics and the infection coming back.  Other     request to have more saline wash sent to RX     S:  41y/o F with UC who presented with NSTI. He had undergone multiple debridements. He is here today for wound f/u. He denies fevers, chills. He states the pain is tolerable. He is not having much drainage. He is having normal bowel movements. /76 (Site: Left Upper Arm, Position: Sitting, Cuff Size: Medium Adult)   Pulse 79   Temp 95.1 °F (35.1 °C) (Infrared)   Resp 15   Ht 5' 11\" (1.803 m)   Wt 177 lb (80.3 kg)   SpO2 100%   BMI 24.69 kg/m²   Gen:  NAD  Right thigh wound:  Good granulation bed, no signs of infection  Right medial thigh wound and scrotum:  Good granulation bed, no exudate  Perineal/perianal wound:  Penrose in place. Small amount of purulent drainage. ASSESSMENT/PLAN:  NSTI and horseshoe abscess of rectum  --c/w penrose  --sitz baths  --monitor for infection  --plan to remove penrose at next visit  --wet to dry dressing daily    RTC 3 weeks.       Cait Fernández MD, MSc, FACS  12/7/2020  12:58 PM

## 2020-12-28 ENCOUNTER — OFFICE VISIT (OUTPATIENT)
Dept: SURGERY | Age: 40
End: 2020-12-28
Payer: COMMERCIAL

## 2020-12-28 VITALS
WEIGHT: 185.6 LBS | SYSTOLIC BLOOD PRESSURE: 129 MMHG | TEMPERATURE: 96.6 F | HEIGHT: 71 IN | OXYGEN SATURATION: 98 % | DIASTOLIC BLOOD PRESSURE: 78 MMHG | HEART RATE: 95 BPM | RESPIRATION RATE: 15 BRPM | BODY MASS INDEX: 25.98 KG/M2

## 2020-12-28 PROCEDURE — 99212 OFFICE O/P EST SF 10 MIN: CPT | Performed by: SURGERY

## 2020-12-28 PROCEDURE — 99024 POSTOP FOLLOW-UP VISIT: CPT | Performed by: SURGERY

## 2020-12-28 NOTE — PROGRESS NOTES
[facility]  PROGRESS NOTE  ATTENDING NOTE    Chief Complaint   Patient presents with    Post-Op Check     S/P I&D wound check groin, pt states area has increase soreness past couple days. S:  38y/o M s/p wide excision of NSTI of right thigh, scrotum, inguinal region. He has been doing the wet to dry dressings. He has been cleaning the wounds in the shower with Hibeclens. He is sore, but pain is tolerable. He states the wounds started getting this yellow appearance a week or two ago. He still has drainage from the perirectal abscess. He is having normal bowel function. /78 (Site: Right Upper Arm, Position: Sitting, Cuff Size: Medium Adult)   Pulse 95   Temp 96.6 °F (35.9 °C) (Infrared)   Resp 15   Ht 5' 11\" (1.803 m)   Wt 185 lb 9.6 oz (84.2 kg)   SpO2 98%   BMI 25.89 kg/m²   Gen;  NAD  Right thigh and scrotum wounds: Thick yellow material that is removable somewhat with gauze and saline. No evidence of infection. Penrose still in place and some drainage of the area. ASSESSMENT/PLAN:  NSTI of the scrotum and inguinal region  --switch to santyl  --c/w cleaning wound daily and try sitz baths for the perirectal wound. --advised to call office if increased fever, chills, redness, or foul odor  Horseshoe abscess  --will lift need a Henney procedure or LIFT procedure in the future once the NSTI wound heals.     RTC 2 weeks    Linward Bence, MD, MSc, FACS  12/28/2020  1:10 PM

## 2021-01-06 ENCOUNTER — TELEPHONE (OUTPATIENT)
Dept: SURGERY | Age: 41
End: 2021-01-06

## 2021-01-25 ENCOUNTER — OFFICE VISIT (OUTPATIENT)
Dept: SURGERY | Age: 41
End: 2021-01-25
Payer: COMMERCIAL

## 2021-01-25 VITALS
HEART RATE: 72 BPM | DIASTOLIC BLOOD PRESSURE: 77 MMHG | RESPIRATION RATE: 16 BRPM | OXYGEN SATURATION: 99 % | WEIGHT: 192 LBS | SYSTOLIC BLOOD PRESSURE: 125 MMHG | HEIGHT: 71 IN | BODY MASS INDEX: 26.88 KG/M2

## 2021-01-25 DIAGNOSIS — Z48.89 ENCOUNTER FOR POST SURGICAL WOUND CHECK: ICD-10-CM

## 2021-01-25 PROCEDURE — 99024 POSTOP FOLLOW-UP VISIT: CPT | Performed by: SURGERY

## 2021-01-25 PROCEDURE — 99212 OFFICE O/P EST SF 10 MIN: CPT | Performed by: SURGERY

## 2021-01-25 NOTE — PROGRESS NOTES
[facility]  PROGRESS NOTE  ATTENDING NOTE    Chief Complaint   Patient presents with    Post-Op Check     post op I&D groin 11/15/20. Denies any problems or concerns. S:  38y/o M s/p NSTI of right medial thigh, perineum, groin and horseshoe abscess of rectum. He has had minimal drainage of the perirectal abscess. He has been cleaning the wound. /77 (Site: Left Upper Arm, Position: Sitting, Cuff Size: Medium Adult)   Pulse 72   Resp 16   Ht 5' 11\" (1.803 m)   Wt 192 lb (87.1 kg)   SpO2 99%   BMI 26.78 kg/m²   Gen:  NAD  Wound:  Healing noted. A lot of superficial exudate. I showed Himanshu Horton how to scrub the wound to remove and get back to healthier granulation tissue. Perirectal:  No drainage, penrose very loose--removed    ASSESSMENT/PLAN:  NSTI of right thigh, perineum, groin  --advised to scrub wound every day.   --if no improvement at next visit will take to OR for debridement  Horseshoe abscess  --penrose remove  --advised patient to watch for infection  --will get MRI pelvis once wounds heal to evaluate further or unless patient develops signs of further infection    Rondal Seip, MD, MSc, FACS  1/25/2021  4:32 PM

## 2021-02-08 ENCOUNTER — OFFICE VISIT (OUTPATIENT)
Dept: SURGERY | Age: 41
End: 2021-02-08
Payer: COMMERCIAL

## 2021-02-08 VITALS
HEART RATE: 80 BPM | RESPIRATION RATE: 14 BRPM | OXYGEN SATURATION: 98 % | WEIGHT: 192 LBS | SYSTOLIC BLOOD PRESSURE: 130 MMHG | BODY MASS INDEX: 26.88 KG/M2 | TEMPERATURE: 97.1 F | DIASTOLIC BLOOD PRESSURE: 72 MMHG | HEIGHT: 71 IN

## 2021-02-08 DIAGNOSIS — Z48.89 ENCOUNTER FOR POST SURGICAL WOUND CHECK: ICD-10-CM

## 2021-02-08 PROCEDURE — 99024 POSTOP FOLLOW-UP VISIT: CPT | Performed by: SURGERY

## 2021-02-08 PROCEDURE — 99212 OFFICE O/P EST SF 10 MIN: CPT | Performed by: SURGERY

## 2021-02-08 NOTE — PROGRESS NOTES
[facility]  PROGRESS NOTE  ATTENDING NOTE    Chief Complaint   Patient presents with    Post-Op Check     wound check. Patient states he thinks wound is getting better. S:  40 y/o M s/p wide excision of wound of right groin, perineum and I&D of horseshoe abscess. He denies fevers or chills. He has been scrubbing the wound to get some of the exudate off. He continues to apply the santyl. /72 (Site: Right Upper Arm, Position: Sitting, Cuff Size: Medium Adult)   Pulse 80   Temp 97.1 °F (36.2 °C) (Infrared)   Resp 14   Ht 5' 11\" (1.803 m)   Wt 192 lb (87.1 kg)   SpO2 98%   BMI 26.78 kg/m²   Wound:  More healing has occurred. There is still a thick layer of exudate, but granulation tissue present as well. Rectal:  Fistula still present, draining mucus occasionally, patient states he hasn't noticed any drainage. On left buttock other opening present is healing no TTP    ASSESSMENT/PLAN:  1. NSTI--s/p debridement--change to wet to dry daily. 2.  Horseshoe abscess--waiting for wound to heal, monitor for infection.   MRI once wound completely healed    RTC 2 weeks for wound check    Kenna Cotto MD, MSc, FACS  2/8/2021  9:46 AM

## 2021-03-08 ENCOUNTER — OFFICE VISIT (OUTPATIENT)
Dept: SURGERY | Age: 41
End: 2021-03-08
Payer: COMMERCIAL

## 2021-03-08 VITALS
BODY MASS INDEX: 27.12 KG/M2 | TEMPERATURE: 95.7 F | SYSTOLIC BLOOD PRESSURE: 132 MMHG | HEART RATE: 80 BPM | HEIGHT: 71 IN | RESPIRATION RATE: 16 BRPM | OXYGEN SATURATION: 100 % | DIASTOLIC BLOOD PRESSURE: 81 MMHG | WEIGHT: 193.7 LBS

## 2021-03-08 DIAGNOSIS — Z51.89 VISIT FOR WOUND CHECK: ICD-10-CM

## 2021-03-08 PROCEDURE — 99212 OFFICE O/P EST SF 10 MIN: CPT | Performed by: SURGERY

## 2021-03-08 NOTE — PROGRESS NOTES
[facility]  PROGRESS NOTE  ATTENDING NOTE    Chief Complaint   Patient presents with    Wound Check     Denies signs of infection     S:  38y/o M presents for f/u of right inguinal, perineal NSTI and horseshoe abscess. He denies and drainage from the rectum. He is having normal bowel function. /81   Pulse 80   Temp 95.7 °F (35.4 °C) (Infrared)   Resp 16   Ht 5' 11\" (1.803 m)   Wt 193 lb 11.2 oz (87.9 kg)   SpO2 100%   BMI 27.02 kg/m²   Gen:  NAD  Wound:  Good granulation tissue, minimal exudate. Rectal:  Wounds well healed around anus. No palpable abscess. No drainage    ASSESSMENT/PLAN:  Right inguinal and perineal NSTI  --c/w LWC, santyl  --RTC 2 months  --MRI pelvis once wounds heal to f/u horseshoe abscess.     RTC 2 months    Juan Kruse MD, MSc, FACS  3/8/2021  3:10 PM

## 2021-05-10 ENCOUNTER — TELEPHONE (OUTPATIENT)
Dept: SURGERY | Age: 41
End: 2021-05-10

## 2021-05-10 NOTE — TELEPHONE ENCOUNTER
MA received incoming phone call from patients wife Amparo Bustos stating patient has gone back to work and can not come in for appointment today. She requests to reschedule in one month. MA rescheduled patient to 6/14/21 at 9:45 AM in the [place] office. Amparo Bustos verbalized understanding of appointment date, time, and location.     Electronically signed by Ean Jewell on 5/10/21 at 8:06 AM EDT

## 2023-03-10 NOTE — TELEPHONE ENCOUNTER
MA received a call from Radha Leon at [facility]. Radha Leon states they need a new order for patient santyl sent over with a corrected Quantity. Radha Leon states with patient wound sizes and directions the quantity needs to be for 630grams. Radha Leon stated everything else on RX was correct and can be refilled out with that information. MA verbalized understanding and advised that a message would be sent to  to correct RX.            Electronically signed by Jose Chase MA on 1/6/21 at 1:55 PM EST Female

## 2023-10-24 NOTE — PROGRESS NOTES
[facility]  PROGRESS NOTE  ATTENDING NOTE    CRITICAL CARE    Chief Complaint   Patient presents with    Wound Check     Gangrene to scrotom and anal abscess transfer from [facility]  Caitlin Jones is a 37 y.o. male who presents for evaluation of scrotal edema, pain and drainage. Patient states that he started having perianal drainage a week ago and that it stopped spontaneously, then over the last several days the drainage worsened and he developed significant scrotal edema, erythema, and pain. Patient is not a known diabetic, he states that he has a history of Ulcerative Colitis but has not seen anyone in regards to this for several years. He states \"I think it may be a fistula\". Chart reviewed from [facility]: WBC 50, Na 123, Cr 1.56, Glc 117. 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 Transferred to general floor    BP (!) 96/55   Pulse 71   Temp 98 °F (36.7 °C) (Infrared)   Resp 14   Ht 5' 10\" (1.778 m)   Wt 180 lb (81.6 kg)   SpO2 93%   BMI 25.83 kg/m²   Physical Exam  Constitutional:       Appearance: Normal appearance. HENT:      Head: Normocephalic and atraumatic. Nose: Nose normal.      Mouth/Throat:      Mouth: Mucous membranes are moist.      Pharynx: Oropharynx is clear. Eyes:      Extraocular Movements: Extraocular movements intact. Pupils: Pupils are equal, round, and reactive to light. Neck:      Musculoskeletal: Normal range of motion and neck supple. Cardiovascular:      Rate and Rhythm: Normal rate and regular rhythm.       Pulses: Normal Patient Management Risk Assessment: High

## 2025-02-06 NOTE — TELEPHONE ENCOUNTER
[id number] Kelly Guzman for 12/7
I called patient's wife. He is NOT out of antibiotics. He is out of narcotics. She does not know when he finished them. I advised to take tylenol and ibuprofen now, no further narcotics prescribed.
MA routing message to  in regards to advisement to see if ok patient is seen by her on 12/7/2020.       Electronically signed by Zuleyma Rios MA on 11/25/20 at 8:25 AM EST
MA spoke with Sherrie Toledo and informed her per Dr. Micheline Napoles to monitor the area, continue wet to dry dressings and inform the office if there are any changes. She verbalized understanding. MA scheduled patient post op appointment per Dr. Michelien Napoles on 12/7/20 at 9:00AM in the [place] office. Lauren verbalized understanding. Sherrie Toledo states patient is also out of his antibiotics and is questioning if he should still be taking something. She states he is also out of pain medication. She states patient has been more active and has been very sore. Forwarding to Dr. Micheline Napoles for advisement.     Electronically signed by Mary Hurd on 11/30/20 at 12:39 PM EST
MA spoke with patient's s/o Batsheva Herring. She states she has been doing the wound care for patient and she noticed there is some yellow in the center of the patient's wound. She states she had a 'nurse friend' look at the wound and the nurse told her the wound still looks necrotic. Batsheva Herring denies the patient having any fever, chills, nausea, and vomiting. Denies redness or swelling from the wound. Denies and odor to the wound. Batsheva Herring states she is very concerned about the look of the yellow area and would like to know what Dr. Lars Alvarado thinks. Routing to Dr. Lars Alvarado for advisement.      Electronically signed by Renu Acuna on 11/30/20 at 11:19 AM EST
Received a call from the patient's wife [phone] who wanting to make an appt. The wife stated she called Dr. Easley Living office and was told to call our office to make a follow up appt. The pt had   DEBRIDEMENT RIGHT GROIN WOUND SKIN AND SUBCUTANEOUS TISSUE on 11/17/20 by Dr. Madeleine Padron     Excisional debridement of right groin, medial right thigh,  and right hemiscrotum. Total area of 8 cm x 5 cm x 2 cm by Dr. Graham Black on 11/15/20     extensive excisional debridement of the right inguinal, scrotum and perineum ([id number]) and incision and drainage of horseshoe abscess of rectum with placement of penrose by Dr. Richard Grossman on 11/13/20     MA spoke with Dr. Madeleine Padron. Per Dr. Madeleine Padron, the patient should follow up with Dr. Richard Grossman. Forwarded message to Clint DURANT.   Electronically signed by Michael Harp on 11/24/20 at 5:09 PM EST
Yes

## (undated) DEVICE — GLOVE SURG SZ 65 THK91MIL LTX FREE SYN POLYISOPRENE

## (undated) DEVICE — GLOVE ORANGE PI 7 1/2   MSG9075

## (undated) DEVICE — PATIENT RETURN ELECTRODE, SINGLE-USE, CONTACT QUALITY MONITORING, ADULT, WITH 9FT CORD, FOR PATIENTS WEIGING OVER 33LBS. (15KG): Brand: MEGADYNE

## (undated) DEVICE — SET INST MINOR PROCEDURE

## (undated) DEVICE — STANDARD HYPODERMIC NEEDLE,ALUMINUM HUB: Brand: MONOJECT

## (undated) DEVICE — SURGICAL PROCEDURE PACK BASIC

## (undated) DEVICE — APPLICATOR MEDICATED 26 CC SOLUTION HI LT ORNG CHLORAPREP

## (undated) DEVICE — GLOVE SURG SZ 75 STD WHT LTX SYN POLYMER BEAD REINF ANTI RL

## (undated) DEVICE — GLOVE ORANGE PI 8   MSG9080

## (undated) DEVICE — BANDAGE,GAUZE,4.5"X4.1YD,STERILE,LF: Brand: MEDLINE

## (undated) DEVICE — PAD,ABDOMINAL,5"X9",ST,LF,25/BX: Brand: MEDLINE INDUSTRIES, INC.

## (undated) DEVICE — SYRINGE MED 10ML TRNSLUC BRL PLUNG BLK MRK POLYPR CTRL

## (undated) DEVICE — GLOVE ORANGE PI 7   MSG9070

## (undated) DEVICE — READY WET SKIN SCRUB TRAY-LF: Brand: MEDLINE INDUSTRIES, INC.

## (undated) DEVICE — TOWEL,OR,DSP,ST,BLUE,STD,6/PK,12PK/CS: Brand: MEDLINE

## (undated) DEVICE — 4-PORT MANIFOLD: Brand: NEPTUNE 2

## (undated) DEVICE — LEGGINGS: Brand: CONVERTORS

## (undated) DEVICE — INTENDED FOR TISSUE SEPARATION, AND OTHER PROCEDURES THAT REQUIRE A SHARP SURGICAL BLADE TO PUNCTURE OR CUT.: Brand: BARD-PARKER ® STAINLESS STEEL BLADES

## (undated) DEVICE — MEDIBRIEF MESH BRIEF - SIZE X-LARGE - SIZE COLOR CODE: GREEN - 2EA/BG, 50BG/CS: Brand: MEDIBRIEF

## (undated) DEVICE — PACK,UNIV, II AURORA: Brand: MEDLINE